# Patient Record
Sex: MALE | Race: WHITE | HISPANIC OR LATINO | URBAN - METROPOLITAN AREA
[De-identification: names, ages, dates, MRNs, and addresses within clinical notes are randomized per-mention and may not be internally consistent; named-entity substitution may affect disease eponyms.]

---

## 2017-06-23 ENCOUNTER — INPATIENT (INPATIENT)
Facility: HOSPITAL | Age: 77
LOS: 7 days | Discharge: HOME | End: 2017-07-01
Attending: INTERNAL MEDICINE | Admitting: INTERNAL MEDICINE
Payer: MEDICAID

## 2017-06-23 VITALS
RESPIRATION RATE: 18 BRPM | OXYGEN SATURATION: 97 % | HEART RATE: 66 BPM | SYSTOLIC BLOOD PRESSURE: 79 MMHG | DIASTOLIC BLOOD PRESSURE: 44 MMHG | TEMPERATURE: 98 F

## 2017-06-23 DIAGNOSIS — Z71.89 OTHER SPECIFIED COUNSELING: ICD-10-CM

## 2017-06-23 DIAGNOSIS — C16.9 MALIGNANT NEOPLASM OF STOMACH, UNSPECIFIED: ICD-10-CM

## 2017-06-23 DIAGNOSIS — Z90.3 ACQUIRED ABSENCE OF STOMACH [PART OF]: Chronic | ICD-10-CM

## 2017-06-23 DIAGNOSIS — C79.9 SECONDARY MALIGNANT NEOPLASM OF UNSPECIFIED SITE: ICD-10-CM

## 2017-06-23 DIAGNOSIS — Z90.5 ACQUIRED ABSENCE OF KIDNEY: Chronic | ICD-10-CM

## 2017-06-23 DIAGNOSIS — Z90.79 ACQUIRED ABSENCE OF OTHER GENITAL ORGAN(S): Chronic | ICD-10-CM

## 2017-06-23 DIAGNOSIS — N39.0 URINARY TRACT INFECTION, SITE NOT SPECIFIED: ICD-10-CM

## 2017-06-23 DIAGNOSIS — N40.0 BENIGN PROSTATIC HYPERPLASIA WITHOUT LOWER URINARY TRACT SYMPTOMS: ICD-10-CM

## 2017-06-23 DIAGNOSIS — R91.8 OTHER NONSPECIFIC ABNORMAL FINDING OF LUNG FIELD: ICD-10-CM

## 2017-06-23 DIAGNOSIS — D63.0 ANEMIA IN NEOPLASTIC DISEASE: ICD-10-CM

## 2017-06-23 DIAGNOSIS — Z29.9 ENCOUNTER FOR PROPHYLACTIC MEASURES, UNSPECIFIED: ICD-10-CM

## 2017-06-23 DIAGNOSIS — N17.9 ACUTE KIDNEY FAILURE, UNSPECIFIED: ICD-10-CM

## 2017-06-23 DIAGNOSIS — R19.7 DIARRHEA, UNSPECIFIED: ICD-10-CM

## 2017-06-23 LAB
ALBUMIN SERPL ELPH-MCNC: 3.6 G/DL — SIGNIFICANT CHANGE UP (ref 3.3–5)
ALP SERPL-CCNC: 158 U/L — HIGH (ref 40–120)
ALT FLD-CCNC: 19 U/L — SIGNIFICANT CHANGE UP (ref 4–41)
APPEARANCE UR: CLEAR — SIGNIFICANT CHANGE UP
APTT BLD: 33.4 SEC — SIGNIFICANT CHANGE UP (ref 27.5–37.4)
AST SERPL-CCNC: 23 U/L — SIGNIFICANT CHANGE UP (ref 4–40)
BASE EXCESS BLDV CALC-SCNC: -3.1 MMOL/L — SIGNIFICANT CHANGE UP
BILIRUB SERPL-MCNC: 0.5 MG/DL — SIGNIFICANT CHANGE UP (ref 0.2–1.2)
BILIRUB UR-MCNC: NEGATIVE — SIGNIFICANT CHANGE UP
BLOOD GAS VENOUS - CREATININE: 1.33 MG/DL — HIGH (ref 0.5–1.3)
BLOOD UR QL VISUAL: NEGATIVE — SIGNIFICANT CHANGE UP
BUN SERPL-MCNC: 28 MG/DL — HIGH (ref 7–23)
CALCIUM SERPL-MCNC: 9.5 MG/DL — SIGNIFICANT CHANGE UP (ref 8.4–10.5)
CHLORIDE BLDV-SCNC: 109 MMOL/L — HIGH (ref 96–108)
CHLORIDE SERPL-SCNC: 104 MMOL/L — SIGNIFICANT CHANGE UP (ref 98–107)
CK MB BLD-MCNC: 1.73 NG/ML — SIGNIFICANT CHANGE UP (ref 1–6.6)
CK SERPL-CCNC: 43 U/L — SIGNIFICANT CHANGE UP (ref 30–200)
CO2 SERPL-SCNC: 22 MMOL/L — SIGNIFICANT CHANGE UP (ref 22–31)
COLOR SPEC: YELLOW — SIGNIFICANT CHANGE UP
CREAT SERPL-MCNC: 1.55 MG/DL — HIGH (ref 0.5–1.3)
GAS PNL BLDV: 139 MMOL/L — SIGNIFICANT CHANGE UP (ref 136–146)
GLUCOSE BLDV-MCNC: 100 — HIGH (ref 70–99)
GLUCOSE SERPL-MCNC: 97 MG/DL — SIGNIFICANT CHANGE UP (ref 70–99)
GLUCOSE UR-MCNC: NEGATIVE — SIGNIFICANT CHANGE UP
HCO3 BLDV-SCNC: 20 MMOL/L — SIGNIFICANT CHANGE UP (ref 20–27)
HCT VFR BLD CALC: 32.6 % — LOW (ref 39–50)
HCT VFR BLD CALC: 32.6 % — LOW (ref 39–50)
HCT VFR BLDV CALC: 33 % — LOW (ref 39–51)
HGB BLD-MCNC: 10.4 G/DL — LOW (ref 13–17)
HGB BLD-MCNC: 10.4 G/DL — LOW (ref 13–17)
HGB BLDV-MCNC: 10.7 G/DL — LOW (ref 13–17)
HYALINE CASTS # UR AUTO: SIGNIFICANT CHANGE UP (ref 0–?)
INR BLD: 1.13 — SIGNIFICANT CHANGE UP (ref 0.88–1.17)
KETONES UR-MCNC: NEGATIVE — SIGNIFICANT CHANGE UP
LACTATE BLDV-MCNC: 1.1 MMOL/L — SIGNIFICANT CHANGE UP (ref 0.5–2)
LEUKOCYTE ESTERASE UR-ACNC: HIGH
MCHC RBC-ENTMCNC: 30.4 PG — SIGNIFICANT CHANGE UP (ref 27–34)
MCHC RBC-ENTMCNC: 30.4 PG — SIGNIFICANT CHANGE UP (ref 27–34)
MCHC RBC-ENTMCNC: 31.9 % — LOW (ref 32–36)
MCHC RBC-ENTMCNC: 31.9 % — LOW (ref 32–36)
MCV RBC AUTO: 95.3 FL — SIGNIFICANT CHANGE UP (ref 80–100)
MCV RBC AUTO: 95.3 FL — SIGNIFICANT CHANGE UP (ref 80–100)
MUCOUS THREADS # UR AUTO: SIGNIFICANT CHANGE UP
NITRITE UR-MCNC: NEGATIVE — SIGNIFICANT CHANGE UP
NON-SQ EPI CELLS # UR AUTO: <1 — SIGNIFICANT CHANGE UP
OB PNL STL: NEGATIVE — SIGNIFICANT CHANGE UP
PCO2 BLDV: 48 MMHG — SIGNIFICANT CHANGE UP (ref 41–51)
PH BLDV: 7.29 PH — LOW (ref 7.32–7.43)
PH UR: 6 — SIGNIFICANT CHANGE UP (ref 4.6–8)
PLATELET # BLD AUTO: 124 K/UL — LOW (ref 150–400)
PLATELET # BLD AUTO: 124 K/UL — LOW (ref 150–400)
PMV BLD: 8.9 FL — SIGNIFICANT CHANGE UP (ref 7–13)
PMV BLD: 8.9 FL — SIGNIFICANT CHANGE UP (ref 7–13)
PO2 BLDV: 25 MMHG — LOW (ref 35–40)
POTASSIUM BLDV-SCNC: 4.3 MMOL/L — SIGNIFICANT CHANGE UP (ref 3.4–4.5)
POTASSIUM SERPL-MCNC: 4.4 MMOL/L — SIGNIFICANT CHANGE UP (ref 3.5–5.3)
POTASSIUM SERPL-SCNC: 4.4 MMOL/L — SIGNIFICANT CHANGE UP (ref 3.5–5.3)
PROT SERPL-MCNC: 8.7 G/DL — HIGH (ref 6–8.3)
PROT UR-MCNC: 20 — SIGNIFICANT CHANGE UP
PROTHROM AB SERPL-ACNC: 12.7 SEC — SIGNIFICANT CHANGE UP (ref 9.8–13.1)
RBC # BLD: 3.42 M/UL — LOW (ref 4.2–5.8)
RBC # BLD: 3.42 M/UL — LOW (ref 4.2–5.8)
RBC # FLD: 15.6 % — HIGH (ref 10.3–14.5)
RBC # FLD: 15.6 % — HIGH (ref 10.3–14.5)
RBC CASTS # UR COMP ASSIST: SIGNIFICANT CHANGE UP (ref 0–?)
SAO2 % BLDV: 34.5 % — LOW (ref 60–85)
SODIUM SERPL-SCNC: 139 MMOL/L — SIGNIFICANT CHANGE UP (ref 135–145)
SP GR SPEC: 1.01 — SIGNIFICANT CHANGE UP (ref 1–1.03)
SQUAMOUS # UR AUTO: SIGNIFICANT CHANGE UP
TROPONIN T SERPL-MCNC: < 0.06 NG/ML — SIGNIFICANT CHANGE UP (ref 0–0.06)
UROBILINOGEN FLD QL: NORMAL E.U. — SIGNIFICANT CHANGE UP (ref 0.1–0.2)
VIT B12 SERPL-MCNC: 2000 PG/ML — HIGH (ref 200–900)
WBC # BLD: 6.63 K/UL — SIGNIFICANT CHANGE UP (ref 3.8–10.5)
WBC # BLD: 6.63 K/UL — SIGNIFICANT CHANGE UP (ref 3.8–10.5)
WBC # FLD AUTO: 6.63 K/UL — SIGNIFICANT CHANGE UP (ref 3.8–10.5)
WBC # FLD AUTO: 6.63 K/UL — SIGNIFICANT CHANGE UP (ref 3.8–10.5)
WBC UR QL: SIGNIFICANT CHANGE UP (ref 0–?)

## 2017-06-23 PROCEDURE — 99223 1ST HOSP IP/OBS HIGH 75: CPT

## 2017-06-23 PROCEDURE — 99497 ADVNCD CARE PLAN 30 MIN: CPT | Mod: 25

## 2017-06-23 PROCEDURE — 71020: CPT | Mod: 26

## 2017-06-23 PROCEDURE — 70450 CT HEAD/BRAIN W/O DYE: CPT | Mod: 26

## 2017-06-23 PROCEDURE — 71250 CT THORAX DX C-: CPT | Mod: 26

## 2017-06-23 RX ORDER — CIPROFLOXACIN LACTATE 400MG/40ML
250 VIAL (ML) INTRAVENOUS EVERY 24 HOURS
Qty: 0 | Refills: 0 | Status: DISCONTINUED | OUTPATIENT
Start: 2017-06-23 | End: 2017-06-30

## 2017-06-23 RX ORDER — ACETAMINOPHEN 500 MG
650 TABLET ORAL EVERY 6 HOURS
Qty: 0 | Refills: 0 | Status: DISCONTINUED | OUTPATIENT
Start: 2017-06-23 | End: 2017-07-01

## 2017-06-23 RX ORDER — METOCLOPRAMIDE HCL 10 MG
10 TABLET ORAL ONCE
Qty: 0 | Refills: 0 | Status: COMPLETED | OUTPATIENT
Start: 2017-06-23 | End: 2017-06-23

## 2017-06-23 RX ORDER — HYDROCORTISONE 1 %
1 OINTMENT (GRAM) TOPICAL
Qty: 0 | Refills: 0 | Status: DISCONTINUED | OUTPATIENT
Start: 2017-06-23 | End: 2017-07-01

## 2017-06-23 RX ORDER — SODIUM CHLORIDE 9 MG/ML
1000 INJECTION INTRAMUSCULAR; INTRAVENOUS; SUBCUTANEOUS ONCE
Qty: 0 | Refills: 0 | Status: COMPLETED | OUTPATIENT
Start: 2017-06-23 | End: 2017-06-23

## 2017-06-23 RX ORDER — HEPARIN SODIUM 5000 [USP'U]/ML
5000 INJECTION INTRAVENOUS; SUBCUTANEOUS EVERY 8 HOURS
Qty: 0 | Refills: 0 | Status: DISCONTINUED | OUTPATIENT
Start: 2017-06-23 | End: 2017-07-01

## 2017-06-23 RX ORDER — CIPROFLOXACIN LACTATE 400MG/40ML
500 VIAL (ML) INTRAVENOUS EVERY 12 HOURS
Qty: 0 | Refills: 0 | Status: DISCONTINUED | OUTPATIENT
Start: 2017-06-23 | End: 2017-06-23

## 2017-06-23 RX ORDER — SODIUM CHLORIDE 9 MG/ML
1000 INJECTION INTRAMUSCULAR; INTRAVENOUS; SUBCUTANEOUS
Qty: 0 | Refills: 0 | Status: DISCONTINUED | OUTPATIENT
Start: 2017-06-23 | End: 2017-06-30

## 2017-06-23 RX ADMIN — SODIUM CHLORIDE 2000 MILLILITER(S): 9 INJECTION INTRAMUSCULAR; INTRAVENOUS; SUBCUTANEOUS at 09:46

## 2017-06-23 RX ADMIN — HEPARIN SODIUM 5000 UNIT(S): 5000 INJECTION INTRAVENOUS; SUBCUTANEOUS at 21:53

## 2017-06-23 RX ADMIN — Medication 250 MILLIGRAM(S): at 22:32

## 2017-06-23 RX ADMIN — SODIUM CHLORIDE 75 MILLILITER(S): 9 INJECTION INTRAMUSCULAR; INTRAVENOUS; SUBCUTANEOUS at 17:59

## 2017-06-23 RX ADMIN — Medication 10 MILLIGRAM(S): at 09:46

## 2017-06-23 NOTE — H&P ADULT - NSHPPHYSICALEXAM_GEN_ALL_CORE
PHYSICAL EXAM:  GENERAL: elderly male, no apparent distress, on room air  HEAD:  Atraumatic, Normocephalic  EYES: EOMI, PERRLA, conjunctiva and sclera clear b/l  CHEST/LUNG: Clear to auscultation bilaterally; No wheezing or crackles  HEART: S1/S2, Regular rate and rhythm; No murmurs, rubs, or gallops  ABDOMEN: Soft, Nontender, Nondistended; Bowel sounds present, large midline scar and small scar in RLQ  EXTREMITIES:  2+ Peripheral Pulses, No clubbing, cyanosis, or edema  PSYCH: normal affect, calm demeanor  NEUROLOGY: AAOX3, no sensory or motor deficits, 5/5 muscle strength equal in all extremities, CN 2-12 grossly intact, negative Kernig and Brudzinski signs  SKIN: No rashes or lesions

## 2017-06-23 NOTE — ED PROVIDER NOTE - OBJECTIVE STATEMENT
77M presents to the ED for not feeling well for 2 days, generalized weakness causing difficulty walking, back pain and subjective fever at night, no cough, mild SOB.  C/O pain to wound site that is no worse since surgery.  Diarrhea for one day 3 times, no blood, but black.  No hx of ulcers.      Pt had tumor from stomach extending to intestine and kidney which was removed in [place] two weeks ago (complicated by intestinal perforation).. As per family no chemotherapy. 77M presents to the ED for not feeling well for 2 days, generalized weakness causing difficulty walking, back pain and subjective fever at night, no cough, mild SOB.  C/O pain to wound site that is no worse since surgery.  Diarrhea for one day 3 times, no blood, but black.  No hx of ulcers.  Pt with headache intermittently over the past few weeks, and developed dizziness described as lightheadedness today.  No hearing loss, no tinnitus.    Pt had tumor from stomach extending to intestine and kidney which was removed in [place] two weeks ago (complicated by intestinal perforation).. As per family no chemotherapy.

## 2017-06-23 NOTE — H&P ADULT - HISTORY OF PRESENT ILLNESS
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 No recent use of antibiotics, no blood in stool but reports stool is black color. No chest pain, cough, SOB, nausea or vomiting. No abdominal pain or back pain but complains of itchiness of back. Complains of headaches and dizziness mainly with walking and b/l feet pain that radiates up to head. No recent falls. Walking limited only by feeling tired. No numbness/tingling. No bowel or bladder incontinence. Currently without headache or dizziness.     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 No recent use of antibiotics, no blood in stool but reports stool is black color. No chest pain, cough, SOB, nausea or vomiting. No abdominal pain or back pain but complains of itchiness of back. Complains of headaches and dizziness mainly with walking and b/l feet pain that radiates up to head. No recent falls. Walking limited only by feeling tired. No numbness/tingling. No bowel or bladder incontinence. Currently without headache or dizziness.     ED: Reglan, 1 L NS, CT head negative  Was hypotensive in ED to 79/44, improved with 1L NS to 106/74, orthostatic negative.    Vital Signs Last 24 Hrs  T(C): 36.7, Max: 36.7 (06-23 @ 14:05)  T(F): 98.1, Max: 98.1 (06-23 @ 14:05)  HR: 65 (65 - 66)  BP: 106/75 (79/44 - 106/75)  BP(mean): --  RR: 16 (16 - 18)  SpO2: 100% (97% - 100%)

## 2017-06-23 NOTE — ED ADULT TRIAGE NOTE - CHIEF COMPLAINT QUOTE
Co sudden onset of dizziness x 10 minutes ago. Co headache and bilateral leg pain. Hx of tumor removal in kidney x 2 months ago.

## 2017-06-23 NOTE — H&P ADULT - NSHPSOCIALHISTORY_GEN_ALL_CORE
denies smoking, ETOH use and drug use, lives in [place] but now visiting [place] and staying with daughter

## 2017-06-23 NOTE — H&P ADULT - ASSESSMENT
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

## 2017-06-23 NOTE — H&P ADULT - PROBLEM SELECTOR PLAN 4
CT chest without contrast to better evaluate, may need biopsy  Pulmonary consult for possible biopsy of pulmonary nodule after CT chest done

## 2017-06-23 NOTE — H&P ADULT - PROBLEM SELECTOR PLAN 9
30 min spent discussing goals of care with patient and daughter at bedside  Patient and daughter would like trial of resuscitation at this time, Full code  HCP: daughter Jenni Mcbride: [phone]

## 2017-06-23 NOTE — H&P ADULT - PROBLEM SELECTOR PLAN 5
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

## 2017-06-23 NOTE — ED PROVIDER NOTE - PHYSICAL EXAMINATION
ATTENDING PHYSICAL EXAM DR. HUGHES ***GEN - NAD; well appearing; A+O x3 ***HEAD - NC/AT ***EYES/NOSE - PERRL, EOMI, mucous membranes moist, no discharge ***THROAT: Oral cavity and pharynx normal. No inflammation, swelling, exudate, or lesions.  ***NECK: Neck supple, non-tender without lymphadenopathy, no masses, no thyromegaly.   ***PULMONARY - CTA b/l, symmetric breath sounds. ***CARDIAC -s1s2, RRR, no M,G,R  ***ABDOMEN - +BS, ND, NT, soft, no guarding, no rebound, no masses   ***BACK - no CVA tenderness, Normal  spine ***EXTREMITIES - symmetric pulses, 2+ dp, capillary refill < 2 seconds, no clubbing, no cyanosis, no edema ***SKIN - no rash or bruising   ***NEUROLOGIC - alert, CN 2-12 intact, reflexes nl, sensation nl, coordination nl, finger to nose nl, romberg negative, motor 5/5 RUE/LUE/RLE/LLE/EHL/Plantar flexion, no pronator drift, gait nl, propioception normal

## 2017-06-23 NOTE — ED ADULT NURSE NOTE - OBJECTIVE STATEMENT
Patient presented to ED A&O x4, with c/o headache,  dizziness, B/L lower leg extremity pain with radiation to knees, hips, and mid to lower back x 3 months d/t decreased mobility from tumor, with worsening of pain after surgery  (1.5 months) when attempting to regain walking. Denies any SOB or CP. EKG done and cardiac monitor in place.  Blood work drawn and sent to lab.  Urine specimen pending. Family present at the bedside.  ER physician evaluated patient, medication ordered and administered.  Plan is for ct scan and xray.  Will continue to monitor patient closely.  Patient is Moroccan speaking and spoken to in native language, and agrees to have family assist with translation.  Chhaya DELATORRE

## 2017-06-23 NOTE — ED PROVIDER NOTE - MEDICAL DECISION MAKING DETAILS
Weakness, diarrhea for one day, subjective fever, HA/dizziness - labs, B12 level, CT head, IVF, reassess,

## 2017-06-23 NOTE — H&P ADULT - PROBLEM SELECTOR PLAN 8
unknown baseline  Transfuse for Hgb <7, keep T/S active, check iron panel, folate, B12>1000  FOBT negative

## 2017-06-24 DIAGNOSIS — R50.9 FEVER, UNSPECIFIED: ICD-10-CM

## 2017-06-24 DIAGNOSIS — R22.9 LOCALIZED SWELLING, MASS AND LUMP, UNSPECIFIED: ICD-10-CM

## 2017-06-24 LAB
ALBUMIN SERPL ELPH-MCNC: 3.1 G/DL — LOW (ref 3.3–5)
ALP SERPL-CCNC: 136 U/L — HIGH (ref 40–120)
ALT FLD-CCNC: 15 U/L — SIGNIFICANT CHANGE UP (ref 4–41)
AST SERPL-CCNC: 18 U/L — SIGNIFICANT CHANGE UP (ref 4–40)
BASOPHILS # BLD AUTO: 0.01 K/UL — SIGNIFICANT CHANGE UP (ref 0–0.2)
BASOPHILS NFR BLD AUTO: 0.2 % — SIGNIFICANT CHANGE UP (ref 0–2)
BILIRUB SERPL-MCNC: 0.4 MG/DL — SIGNIFICANT CHANGE UP (ref 0.2–1.2)
BLD GP AB SCN SERPL QL: NEGATIVE — SIGNIFICANT CHANGE UP
BUN SERPL-MCNC: 22 MG/DL — SIGNIFICANT CHANGE UP (ref 7–23)
CALCIUM SERPL-MCNC: 8.8 MG/DL — SIGNIFICANT CHANGE UP (ref 8.4–10.5)
CHLORIDE SERPL-SCNC: 110 MMOL/L — HIGH (ref 98–107)
CO2 SERPL-SCNC: 17 MMOL/L — LOW (ref 22–31)
CREAT SERPL-MCNC: 1.03 MG/DL — SIGNIFICANT CHANGE UP (ref 0.5–1.3)
EOSINOPHIL # BLD AUTO: 0.18 K/UL — SIGNIFICANT CHANGE UP (ref 0–0.5)
EOSINOPHIL NFR BLD AUTO: 3.8 % — SIGNIFICANT CHANGE UP (ref 0–6)
FERRITIN SERPL-MCNC: 995.9 NG/ML — HIGH (ref 30–400)
FOLATE SERPL-MCNC: 7.3 NG/ML — SIGNIFICANT CHANGE UP (ref 4.7–20)
GLUCOSE SERPL-MCNC: 81 MG/DL — SIGNIFICANT CHANGE UP (ref 70–99)
HCT VFR BLD CALC: 30.8 % — LOW (ref 39–50)
HGB BLD-MCNC: 9.8 G/DL — LOW (ref 13–17)
HIV1 AG SER QL: SIGNIFICANT CHANGE UP
HIV1+2 AB SPEC QL: SIGNIFICANT CHANGE UP
IMM GRANULOCYTES NFR BLD AUTO: 0 % — SIGNIFICANT CHANGE UP (ref 0–1.5)
IRON SATN MFR SERPL: 121 UG/DL — LOW (ref 155–535)
IRON SATN MFR SERPL: 42 UG/DL — LOW (ref 45–165)
LYMPHOCYTES # BLD AUTO: 1.55 K/UL — SIGNIFICANT CHANGE UP (ref 1–3.3)
LYMPHOCYTES # BLD AUTO: 32.5 % — SIGNIFICANT CHANGE UP (ref 13–44)
MAGNESIUM SERPL-MCNC: 1.3 MG/DL — LOW (ref 1.6–2.6)
MCHC RBC-ENTMCNC: 30.2 PG — SIGNIFICANT CHANGE UP (ref 27–34)
MCHC RBC-ENTMCNC: 31.8 % — LOW (ref 32–36)
MCV RBC AUTO: 94.8 FL — SIGNIFICANT CHANGE UP (ref 80–100)
MONOCYTES # BLD AUTO: 0.49 K/UL — SIGNIFICANT CHANGE UP (ref 0–0.9)
MONOCYTES NFR BLD AUTO: 10.3 % — SIGNIFICANT CHANGE UP (ref 2–14)
NEUTROPHILS # BLD AUTO: 2.54 K/UL — SIGNIFICANT CHANGE UP (ref 1.8–7.4)
NEUTROPHILS NFR BLD AUTO: 53.2 % — SIGNIFICANT CHANGE UP (ref 43–77)
PHOSPHATE SERPL-MCNC: 3.2 MG/DL — SIGNIFICANT CHANGE UP (ref 2.5–4.5)
PLATELET # BLD AUTO: 111 K/UL — LOW (ref 150–400)
PMV BLD: 9 FL — SIGNIFICANT CHANGE UP (ref 7–13)
POTASSIUM SERPL-MCNC: 4.4 MMOL/L — SIGNIFICANT CHANGE UP (ref 3.5–5.3)
POTASSIUM SERPL-SCNC: 4.4 MMOL/L — SIGNIFICANT CHANGE UP (ref 3.5–5.3)
PROT SERPL-MCNC: 7.8 G/DL — SIGNIFICANT CHANGE UP (ref 6–8.3)
RBC # BLD: 3.25 M/UL — LOW (ref 4.2–5.8)
RBC # FLD: 15.4 % — HIGH (ref 10.3–14.5)
RH IG SCN BLD-IMP: POSITIVE — SIGNIFICANT CHANGE UP
SODIUM SERPL-SCNC: 142 MMOL/L — SIGNIFICANT CHANGE UP (ref 135–145)
UIBC SERPL-MCNC: 79 UG/DL — LOW (ref 110–370)
URATE SERPL-MCNC: 7.1 MG/DL — SIGNIFICANT CHANGE UP (ref 3.4–8.8)
WBC # BLD: 4.77 K/UL — SIGNIFICANT CHANGE UP (ref 3.8–10.5)
WBC # FLD AUTO: 4.77 K/UL — SIGNIFICANT CHANGE UP (ref 3.8–10.5)

## 2017-06-24 PROCEDURE — 74177 CT ABD & PELVIS W/CONTRAST: CPT | Mod: 26

## 2017-06-24 PROCEDURE — 71260 CT THORAX DX C+: CPT | Mod: 26

## 2017-06-24 PROCEDURE — 99253 IP/OBS CNSLTJ NEW/EST LOW 45: CPT | Mod: GC

## 2017-06-24 RX ORDER — MAGNESIUM SULFATE 500 MG/ML
1 VIAL (ML) INJECTION ONCE
Qty: 0 | Refills: 0 | Status: COMPLETED | OUTPATIENT
Start: 2017-06-24 | End: 2017-06-24

## 2017-06-24 RX ADMIN — HEPARIN SODIUM 5000 UNIT(S): 5000 INJECTION INTRAVENOUS; SUBCUTANEOUS at 21:51

## 2017-06-24 RX ADMIN — Medication 100 GRAM(S): at 11:52

## 2017-06-24 RX ADMIN — HEPARIN SODIUM 5000 UNIT(S): 5000 INJECTION INTRAVENOUS; SUBCUTANEOUS at 14:26

## 2017-06-24 RX ADMIN — SODIUM CHLORIDE 75 MILLILITER(S): 9 INJECTION INTRAMUSCULAR; INTRAVENOUS; SUBCUTANEOUS at 05:18

## 2017-06-24 RX ADMIN — HEPARIN SODIUM 5000 UNIT(S): 5000 INJECTION INTRAVENOUS; SUBCUTANEOUS at 05:13

## 2017-06-24 RX ADMIN — Medication 250 MILLIGRAM(S): at 19:25

## 2017-06-24 NOTE — CONSULT NOTE ADULT - ASSESSMENT
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 malignancy? dx in [place] with known disease extending to kidney s.p nephrectomy and partial gastrectomy, treatment naive p/w fever 77 male with ? malignancy? dx in [place] with known disease extending to kidney s.p nephrectomy and partial gastrectomy, treatment naive p/w fever diarrhea, with CT chest concerning for metastatic disease.

## 2017-06-24 NOTE — CONSULT NOTE ADULT - SUBJECTIVE AND OBJECTIVE BOX
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 No recent use of antibiotics, no blood in stool but reports stool is black color. No chest pain, cough, SOB, nausea or vomiting. No abdominal pain or back pain but complains of itchiness of back. Complains of headaches and dizziness mainly with walking and b/l feet pain that radiates up to head. No recent falls. Walking limited only by feeling tired. No numbness/tingling. No bowel or bladder incontinence. Currently without headache or dizziness.     ED: Reglan, 1 L NS, CT head negative  Was hypotensive in ED to 79/44, improved with 1L NS to 106/74, orthostatic negative.    Vital Signs Last 24 Hrs  T(C): 36.7, Max: 36.7 (06-23 @ 14:05)  T(F): 98.1, Max: 98.1 (06-23 @ 14:05)  HR: 65 (65 - 66)  BP: 106/75 (79/44 - 106/75)  BP(mean): --  RR: 16 (16 - 18)  SpO2: 100% (97% - 100%) (2017 14:53)      PAST MEDICAL & SURGICAL HISTORY:  BPH (benign prostatic hyperplasia)  Stomach cancer  S/P partial gastrectomy  H/O unilateral nephrectomy  H/O prostatectomy      General: denies fevers, chills  Skin/Breast: denies rash   Ophthalmologic: denies blurry vision  ENMT: denies throat pain  Respiratory and Thorax: denies cough, denies shortness of breath  Cardiovascular: denies chest pain, palpitations. Denies LE swelling   Gastrointestinal: denies abdominal pain/ nausea/ vomiting/ diarrhea. Denies BRBPR/ melena   Genitourinary: Denies dysuria  Musculoskeletal: Denies mylagias   Neurological: Denies syncope  Psychiatric: Denies mood disturbance   Hematology/Lymphatics: denies bleeding/bruising. Denies skin lumps 	    MEDICATIONS  (STANDING):  heparin  Injectable 5000Unit(s) SubCutaneous every 8 hours  sodium chloride 0.9%. 1000milliLiter(s) IV Continuous <Continuous>  ciprofloxacin     Tablet 250milliGRAM(s) Oral every 24 hours  magnesium sulfate  IVPB 1Gram(s) IV Intermittent once    MEDICATIONS  (PRN):  acetaminophen   Tablet. 650milliGRAM(s) Oral every 6 hours PRN Moderate Pain (4 - 6)  hydrocortisone 0.5% Ointment 1Application(s) Topical two times a day PRN Itching      Allergies    No Known Allergies    Intolerances        SOCIAL HISTORY:    FAMILY HISTORY:  No pertinent family history in first degree relatives      Vital Signs Last 24 Hrs  T(C): 37, Max: 37 ( @ 05:11)  T(F): 98.6, Max: 98.6 ( @ 05:11)  HR: 63 (61 - 65)  BP: 102/52 (102/52 - 113/56)  BP(mean): --  RR: 16 (16 - 16)  SpO2: 97% (97% - 100%)    PHYSICAL EXAM:    GENERAL: NAD, AAOx3   HEAD:  NC/AT  EYES: EOMI, PERRLA, no scleral icterus  HEENT: Moist mucous membranes  LUNG: Clear to auscultation bilaterally; No rales, rhonchi, wheezing, or rubs  HEART: RRR; No murmurs, rubs, or gallops  ABDOMEN: +BS, ST/ND/NT  EXTREMITIES:  2+ Peripheral Pulses, No clubbing, cyanosis, or edema  LAD: no palpable adenopathy    LABS:                        9.8    4.77  )-----------( 111      ( 2017 06:44 )             30.8     06-24    142  |  110<H>  |  22  ----------------------------<  81  4.4   |  17<L>  |  1.03    Ca    8.8      2017 06:44  Phos  3.2     06-24  Mg     1.3     -24    TPro  7.8  /  Alb  3.1<L>  /  TBili  0.4  /  DBili  x   /  AST  18  /  ALT  15  /  AlkPhos  136<H>  06-24    PT/INR - ( 2017 09:30 )   PT: 12.7 SEC;   INR: 1.13          PTT - ( 2017 09:30 )  PTT:33.4 SEC  Urinalysis Basic - ( 2017 11:10 )    Color: YELLOW / Appearance: CLEAR / S.010 / pH: 6.0  Gluc: NEGATIVE / Ketone: NEGATIVE  / Bili: NEGATIVE / Urobili: NORMAL E.U.   Blood: NEGATIVE / Protein: 20 / Nitrite: NEGATIVE   Leuk Esterase: MODERATE / RBC: 0-2 / WBC 10-25   Sq Epi: OCC / Non Sq Epi: x / Bacteria: x      Folate, Serum: 7.3 ng/mL ( @ 06:44)        RADIOLOGY & ADDITIONAL STUDIES:    EXAM:  CT BRAIN        PROCEDURE DATE:  2017         INTERPRETATION:  History stomach cancer. Headache.     Multiple axial sections were performed from base of skull to vertex   without contrast enhancement.    Parenchymal volume loss is seen.    There is no evidence of acute hemorrhage, mass, mass effect or acute   territorial infarct seen.    Evaluation of the osseous structures with the appropriate window appears   unremarkable    The visualized paranasal sinuses mastoid and middle ear regions appear   clear.    Impression: No evidence of acute hemorrhage, mass or mass effect.                  QUAN GUTIERREZ M.D., ATTENDING RADIOLOGIST  This document has been electronically signed. 2017 10:10AM              EXAM:  RAD CHEST PA LAT        PROCEDURE DATE:  2017         INTERPRETATION:  CLINICAL INDICATION: dizziness; gastric cancer status   post partial gastrectomy; history of prostatectomy and unilateral   nephrectomy    EXAM:  Frontal and lateral chest from 2017 at 0946. No prior chest x-ray   available at this institution for comparison.    IMPRESSION:  Multiple bilateral pulmonary nodules with basilar predominance suspicious   for metastatic disease. Largest in peripheral left lower lung measuring   approximately 3.1 cm in diameter. Chest CT advised to further assess.    No focal infiltrates, pleural effusions, pneumothorax, or pulmonary edema.    Cardiac and mediastinal silhouettes otherwise within normal limits.    Trachea midline.    Generalized osteopenia and mild spinal degenerative changes.    Discussed with ED attending Dr. Fenton on 2017 at 1155 with read   back.                   HUY BUSBY M.D., ATTENDING RADIOLOGIST HPI:  77 male, Romansh speaking visiting from [place], PMH ? 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 No recent use of antibiotics, no blood in stool but reports stool is black color. No chest pain, cough, SOB, nausea or vomiting. No abdominal pain or back pain but complains of itchiness of back. Complains of headaches and dizziness mainly with walking and b/l feet pain that radiates up to head. No recent falls. Walking limited only by feeling tired. No numbness/tingling. No bowel or bladder incontinence. Currently without headache or dizziness.     ED: Reglan, 1 L NS, CT head negative  Was hypotensive in ED to 79/44, improved with 1L NS to 106/74, orthostatic negative.    Vital Signs Last 24 Hrs  T(C): 36.7, Max: 36.7 (06-23 @ 14:05)  T(F): 98.1, Max: 98.1 (06-23 @ 14:05)  HR: 65 (65 - 66)  BP: 106/75 (79/44 - 106/75)  BP(mean): --  RR: 16 (16 - 18)  SpO2: 100% (97% - 100%) (2017 14:53)      PAST MEDICAL & SURGICAL HISTORY:  BPH (benign prostatic hyperplasia)  Stomach cancer  S/P partial gastrectomy  H/O unilateral nephrectomy  H/O prostatectomy      General: denies fevers, chills  Skin/Breast: denies rash   Ophthalmologic: denies blurry vision  ENMT: denies throat pain  Respiratory and Thorax: denies cough, denies shortness of breath  Cardiovascular: denies chest pain, palpitations. Denies LE swelling   Gastrointestinal: denies abdominal pain/ nausea/ vomiting/ diarrhea. Denies BRBPR/ melena   Genitourinary: Denies dysuria  Musculoskeletal: Denies mylagias   Neurological: Denies syncope  Psychiatric: Denies mood disturbance   Hematology/Lymphatics: denies bleeding/bruising. Denies skin lumps 	    MEDICATIONS  (STANDING):  heparin  Injectable 5000Unit(s) SubCutaneous every 8 hours  sodium chloride 0.9%. 1000milliLiter(s) IV Continuous <Continuous>  ciprofloxacin     Tablet 250milliGRAM(s) Oral every 24 hours  magnesium sulfate  IVPB 1Gram(s) IV Intermittent once    MEDICATIONS  (PRN):  acetaminophen   Tablet. 650milliGRAM(s) Oral every 6 hours PRN Moderate Pain (4 - 6)  hydrocortisone 0.5% Ointment 1Application(s) Topical two times a day PRN Itching      Allergies    No Known Allergies    Intolerances        SOCIAL HISTORY:    FAMILY HISTORY:  No pertinent family history in first degree relatives      Vital Signs Last 24 Hrs  T(C): 37, Max: 37 ( @ 05:11)  T(F): 98.6, Max: 98.6 ( @ 05:11)  HR: 63 (61 - 65)  BP: 102/52 (102/52 - 113/56)  BP(mean): --  RR: 16 (16 - 16)  SpO2: 97% (97% - 100%)    PHYSICAL EXAM:    GENERAL: NAD, AAOx3   HEAD:  NC/AT  EYES: EOMI, PERRLA, no scleral icterus  HEENT: Moist mucous membranes  LUNG: Clear to auscultation bilaterally; No rales, rhonchi, wheezing, or rubs  HEART: RRR; No murmurs, rubs, or gallops  ABDOMEN: +BS, ST/ND/NT  EXTREMITIES:  2+ Peripheral Pulses, No clubbing, cyanosis, or edema  LAD: no palpable adenopathy    LABS:                        9.8    4.77  )-----------( 111      ( 2017 06:44 )             30.8     06-24    142  |  110<H>  |  22  ----------------------------<  81  4.4   |  17<L>  |  1.03    Ca    8.8      2017 06:44  Phos  3.2     -24  Mg     1.3     -24    TPro  7.8  /  Alb  3.1<L>  /  TBili  0.4  /  DBili  x   /  AST  18  /  ALT  15  /  AlkPhos  136<H>  06-24    PT/INR - ( 2017 09:30 )   PT: 12.7 SEC;   INR: 1.13          PTT - ( 2017 09:30 )  PTT:33.4 SEC  Urinalysis Basic - ( 2017 11:10 )    Color: YELLOW / Appearance: CLEAR / S.010 / pH: 6.0  Gluc: NEGATIVE / Ketone: NEGATIVE  / Bili: NEGATIVE / Urobili: NORMAL E.U.   Blood: NEGATIVE / Protein: 20 / Nitrite: NEGATIVE   Leuk Esterase: MODERATE / RBC: 0-2 / WBC 10-25   Sq Epi: OCC / Non Sq Epi: x / Bacteria: x      Folate, Serum: 7.3 ng/mL ( @ 06:44)        RADIOLOGY & ADDITIONAL STUDIES:    EXAM:  CT BRAIN        PROCEDURE DATE:  2017         INTERPRETATION:  History stomach cancer. Headache.     Multiple axial sections were performed from base of skull to vertex   without contrast enhancement.    Parenchymal volume loss is seen.    There is no evidence of acute hemorrhage, mass, mass effect or acute   territorial infarct seen.    Evaluation of the osseous structures with the appropriate window appears   unremarkable    The visualized paranasal sinuses mastoid and middle ear regions appear   clear.    Impression: No evidence of acute hemorrhage, mass or mass effect.                  QUAN GUTIERREZ M.D., ATTENDING RADIOLOGIST  This document has been electronically signed. 2017 10:10AM              EXAM:  RAD CHEST PA LAT        PROCEDURE DATE:  2017         INTERPRETATION:  CLINICAL INDICATION: dizziness; gastric cancer status   post partial gastrectomy; history of prostatectomy and unilateral   nephrectomy    EXAM:  Frontal and lateral chest from 2017 at 0946. No prior chest x-ray   available at this institution for comparison.    IMPRESSION:  Multiple bilateral pulmonary nodules with basilar predominance suspicious   for metastatic disease. Largest in peripheral left lower lung measuring   approximately 3.1 cm in diameter. Chest CT advised to further assess.    No focal infiltrates, pleural effusions, pneumothorax, or pulmonary edema.    Cardiac and mediastinal silhouettes otherwise within normal limits.    Trachea midline.    Generalized osteopenia and mild spinal degenerative changes.    Discussed with ED attending Dr. Fenton on 2017 at 1155 with read   back.                   HUY BUSBY M.D., ATTENDING RADIOLOGIST HPI:  77 male, Serbian speaking visiting from [place], PMH ? 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 No recent use of antibiotics, no blood in stool but reports stool is black color. No chest pain, cough, SOB, nausea or vomiting. No abdominal pain or back pain but complains of itchiness of back. Complains of headaches and dizziness mainly with walking and b/l feet pain that radiates up to head. No recent falls. Walking limited only by feeling tired. No numbness/tingling. No bowel or bladder incontinence. Currently without headache or dizziness.     ED: Reglan, 1 L NS, CT head negative  Was hypotensive in ED to 79/44, improved with 1L NS to 106/74, orthostatic negative.    Vital Signs Last 24 Hrs  T(C): 36.7, Max: 36.7 (06-23 @ 14:05)  T(F): 98.1, Max: 98.1 (06-23 @ 14:05)  HR: 65 (65 - 66)  BP: 106/75 (79/44 - 106/75)  BP(mean): --  RR: 16 (16 - 18)  SpO2: 100% (97% - 100%) (2017 14:53)      PAST MEDICAL & SURGICAL HISTORY:  BPH (benign prostatic hyperplasia)  S/P partial gastrectomy  H/O unilateral nephrectomy  H/O TURP      General: fatigue, weight loss  Skin/Breast: denies rash   Ophthalmologic: denies blurry vision  ENMT: denies throat pain  Respiratory and Thorax: denies cough, denies shortness of breath  Cardiovascular: denies chest pain, palpitations. Denies LE swelling   Gastrointestinal: denies abdominal pain/ nausea/ vomiting/ diarrhea. Denies BRBPR/ melena   Genitourinary: Denies dysuria  Musculoskeletal: c/o back pain  Neurological: Denies syncope  Psychiatric: Denies mood disturbance   Hematology/Lymphatics: denies bleeding/bruising. Denies skin lumps 	    MEDICATIONS  (STANDING):  heparin  Injectable 5000Unit(s) SubCutaneous every 8 hours  sodium chloride 0.9%. 1000milliLiter(s) IV Continuous <Continuous>  ciprofloxacin     Tablet 250milliGRAM(s) Oral every 24 hours  magnesium sulfate  IVPB 1Gram(s) IV Intermittent once    MEDICATIONS  (PRN):  acetaminophen   Tablet. 650milliGRAM(s) Oral every 6 hours PRN Moderate Pain (4 - 6)  hydrocortisone 0.5% Ointment 1Application(s) Topical two times a day PRN Itching      Allergies    No Known Allergies    Intolerances        SOCIAL HISTORY:    FAMILY HISTORY:  No pertinent family history in first degree relatives      Vital Signs Last 24 Hrs  T(C): 37, Max: 37 ( @ 05:11)  T(F): 98.6, Max: 98.6 ( @ 05:11)  HR: 63 (61 - 65)  BP: 102/52 (102/52 - 113/56)  BP(mean): --  RR: 16 (16 - 16)  SpO2: 97% (97% - 100%)    PHYSICAL EXAM:    GENERAL: NAD, AAOx3   HEAD:  NC/AT  EYES: EOMI, PERRLA, no scleral icterus  HEENT: Moist mucous membranes  LUNG: Clear to auscultation bilaterally; No rales, rhonchi, wheezing, or rubs  HEART: RRR; No murmurs, rubs, or gallops  ABDOMEN: +BS, ST/ND/NT  EXTREMITIES:  2+ Peripheral Pulses, No clubbing, cyanosis, or edema  LAD: no palpable adenopathy    LABS:                        9.8    4.77  )-----------( 111      ( 2017 06:44 )             30.8     06-24    142  |  110<H>  |  22  ----------------------------<  81  4.4   |  17<L>  |  1.03    Ca    8.8      2017 06:44  Phos  3.2     06-24  Mg     1.3     -24    TPro  7.8  /  Alb  3.1<L>  /  TBili  0.4  /  DBili  x   /  AST  18  /  ALT  15  /  AlkPhos  136<H>  06-24    PT/INR - ( 2017 09:30 )   PT: 12.7 SEC;   INR: 1.13          PTT - ( 2017 09:30 )  PTT:33.4 SEC  Urinalysis Basic - ( 2017 11:10 )    Color: YELLOW / Appearance: CLEAR / S.010 / pH: 6.0  Gluc: NEGATIVE / Ketone: NEGATIVE  / Bili: NEGATIVE / Urobili: NORMAL E.U.   Blood: NEGATIVE / Protein: 20 / Nitrite: NEGATIVE   Leuk Esterase: MODERATE / RBC: 0-2 / WBC 10-25   Sq Epi: OCC / Non Sq Epi: x / Bacteria: x      Folate, Serum: 7.3 ng/mL ( @ 06:44)        RADIOLOGY & ADDITIONAL STUDIES:    EXAM:  CT BRAIN        PROCEDURE DATE:  2017         INTERPRETATION:  History stomach cancer. Headache.     Multiple axial sections were performed from base of skull to vertex   without contrast enhancement.    Parenchymal volume loss is seen.    There is no evidence of acute hemorrhage, mass, mass effect or acute   territorial infarct seen.    Evaluation of the osseous structures with the appropriate window appears   unremarkable    The visualized paranasal sinuses mastoid and middle ear regions appear   clear.    Impression: No evidence of acute hemorrhage, mass or mass effect.                  QUAN GUTIERREZ M.D., ATTENDING RADIOLOGIST  This document has been electronically signed. 2017 10:10AM              EXAM:  RAD CHEST PA LAT        PROCEDURE DATE:  2017         INTERPRETATION:  CLINICAL INDICATION: dizziness; gastric cancer status   post partial gastrectomy; history of prostatectomy and unilateral   nephrectomy    EXAM:  Frontal and lateral chest from 2017 at 0946. No prior chest x-ray   available at this institution for comparison.    IMPRESSION:  Multiple bilateral pulmonary nodules with basilar predominance suspicious   for metastatic disease. Largest in peripheral left lower lung measuring   approximately 3.1 cm in diameter. Chest CT advised to further assess.    No focal infiltrates, pleural effusions, pneumothorax, or pulmonary edema.    Cardiac and mediastinal silhouettes otherwise within normal limits.    Trachea midline.    Generalized osteopenia and mild spinal degenerative changes.    Discussed with ED attending Dr. Fenton on 2017 at 1155 with read   back.                   HUY BUSBY M.D., ATTENDING RADIOLOGIST

## 2017-06-24 NOTE — CONSULT NOTE ADULT - PROBLEM SELECTOR RECOMMENDATION 9
- will need records from [place] with full pathology to determine treatment course  - suggest CT chest abd pelvis with contrast if renal function is ok for full staging, will likely need to biopsy in house to determine primary - will need records from [place] with full pathology to determine treatment course  - suggest CT chest abd pelvis with contrast if renal function is ok for full staging. Once this is done will need to biopsy in house to determine primary and appropriate care. -  per family member bedside pt will likely return to [place], however should he not we can proceed with the following  -will need records from [place] with full pathology to determine treatment course  - suggest CT chest abd pelvis with contrast if renal function is ok for full staging. Once this is done will need to biopsy in house to determine primary and appropriate care. -  per family member bedside pt will not be returning to [place] and wants to have his care continued here.   -will need records from [place] with full pathology to determine treatment course  - suggest CT abd pelvis with contrast if renal function is ok for full staging. Once this is done will need to biopsy in house to determine primary and appropriate care.  -ldh, uric acid, PSA, testosterone. Pending CT a/p findings can add more tumor markers as indicated.  - HIV, Hepatitis.  -nutrition consult  -encourage ambulation  -may need GI consult for colonoscopy if nothing revealing on Ct a/p.   -  s/w consult regarding assistance with insurance.

## 2017-06-24 NOTE — PHYSICAL THERAPY INITIAL EVALUATION ADULT - PERTINENT HX OF CURRENT PROBLEM, REHAB EVAL
77 male, Uzbek speaking visiting from [place], [facility] gastric tumor (diagnosed 3 months ago in [place]) extending to kidney and intestine s/p partial gastrectomy and right nephrectomy (never received chemo or radiation), BPH s/p prostatectomy presents with generalized weakness, subjective fever and watery diarrhea for 3 days.

## 2017-06-24 NOTE — PHYSICAL THERAPY INITIAL EVALUATION ADULT - PLANNED THERAPY INTERVENTIONS, PT EVAL
Pt left sitting in chair in NAD; call bell in reach; +IV./bed mobility training/transfer training/strengthening/gait training

## 2017-06-25 LAB
ALBUMIN SERPL ELPH-MCNC: 3 G/DL — LOW (ref 3.3–5)
ALP SERPL-CCNC: 133 U/L — HIGH (ref 40–120)
ALT FLD-CCNC: 18 U/L — SIGNIFICANT CHANGE UP (ref 4–41)
AST SERPL-CCNC: 25 U/L — SIGNIFICANT CHANGE UP (ref 4–40)
BACTERIA UR CULT: SIGNIFICANT CHANGE UP
BASOPHILS # BLD AUTO: 0.01 K/UL — SIGNIFICANT CHANGE UP (ref 0–0.2)
BASOPHILS NFR BLD AUTO: 0.2 % — SIGNIFICANT CHANGE UP (ref 0–2)
BILIRUB SERPL-MCNC: 0.3 MG/DL — SIGNIFICANT CHANGE UP (ref 0.2–1.2)
BUN SERPL-MCNC: 22 MG/DL — SIGNIFICANT CHANGE UP (ref 7–23)
CALCIUM SERPL-MCNC: 8.3 MG/DL — LOW (ref 8.4–10.5)
CHLORIDE SERPL-SCNC: 106 MMOL/L — SIGNIFICANT CHANGE UP (ref 98–107)
CO2 SERPL-SCNC: 18 MMOL/L — LOW (ref 22–31)
CREAT SERPL-MCNC: 1.07 MG/DL — SIGNIFICANT CHANGE UP (ref 0.5–1.3)
EOSINOPHIL # BLD AUTO: 0.16 K/UL — SIGNIFICANT CHANGE UP (ref 0–0.5)
EOSINOPHIL NFR BLD AUTO: 3.5 % — SIGNIFICANT CHANGE UP (ref 0–6)
GLUCOSE SERPL-MCNC: 76 MG/DL — SIGNIFICANT CHANGE UP (ref 70–99)
HAV IGM SER-ACNC: NONREACTIVE — SIGNIFICANT CHANGE UP
HBV CORE IGM SER-ACNC: NONREACTIVE — SIGNIFICANT CHANGE UP
HBV SURFACE AG SER-ACNC: NONREACTIVE — SIGNIFICANT CHANGE UP
HCT VFR BLD CALC: 30.5 % — LOW (ref 39–50)
HCV AB S/CO SERPL IA: 0.17 S/CO — SIGNIFICANT CHANGE UP
HCV AB SERPL-IMP: SIGNIFICANT CHANGE UP
HGB BLD-MCNC: 9.9 G/DL — LOW (ref 13–17)
IMM GRANULOCYTES NFR BLD AUTO: 0.2 % — SIGNIFICANT CHANGE UP (ref 0–1.5)
LDH SERPL L TO P-CCNC: 86 U/L — LOW (ref 135–225)
LYMPHOCYTES # BLD AUTO: 1.47 K/UL — SIGNIFICANT CHANGE UP (ref 1–3.3)
LYMPHOCYTES # BLD AUTO: 32.2 % — SIGNIFICANT CHANGE UP (ref 13–44)
MCHC RBC-ENTMCNC: 30.6 PG — SIGNIFICANT CHANGE UP (ref 27–34)
MCHC RBC-ENTMCNC: 32.5 % — SIGNIFICANT CHANGE UP (ref 32–36)
MCV RBC AUTO: 94.1 FL — SIGNIFICANT CHANGE UP (ref 80–100)
MONOCYTES # BLD AUTO: 0.45 K/UL — SIGNIFICANT CHANGE UP (ref 0–0.9)
MONOCYTES NFR BLD AUTO: 9.9 % — SIGNIFICANT CHANGE UP (ref 2–14)
NEUTROPHILS # BLD AUTO: 2.46 K/UL — SIGNIFICANT CHANGE UP (ref 1.8–7.4)
NEUTROPHILS NFR BLD AUTO: 54 % — SIGNIFICANT CHANGE UP (ref 43–77)
PLATELET # BLD AUTO: 112 K/UL — LOW (ref 150–400)
PMV BLD: 9.1 FL — SIGNIFICANT CHANGE UP (ref 7–13)
POTASSIUM SERPL-MCNC: 4.2 MMOL/L — SIGNIFICANT CHANGE UP (ref 3.5–5.3)
POTASSIUM SERPL-SCNC: 4.2 MMOL/L — SIGNIFICANT CHANGE UP (ref 3.5–5.3)
PROT SERPL-MCNC: 7.5 G/DL — SIGNIFICANT CHANGE UP (ref 6–8.3)
PSA FLD-MCNC: 0.77 NG/ML — SIGNIFICANT CHANGE UP (ref 0–4)
RBC # BLD: 3.24 M/UL — LOW (ref 4.2–5.8)
RBC # FLD: 15.2 % — HIGH (ref 10.3–14.5)
SODIUM SERPL-SCNC: 138 MMOL/L — SIGNIFICANT CHANGE UP (ref 135–145)
SPECIMEN SOURCE: SIGNIFICANT CHANGE UP
TESTOST FREE+TOTAL PANEL SERPL-MCNC: 551.4 NG/DL — SIGNIFICANT CHANGE UP (ref 193–740)
WBC # BLD: 4.56 K/UL — SIGNIFICANT CHANGE UP (ref 3.8–10.5)
WBC # FLD AUTO: 4.56 K/UL — SIGNIFICANT CHANGE UP (ref 3.8–10.5)

## 2017-06-25 PROCEDURE — 72100 X-RAY EXAM L-S SPINE 2/3 VWS: CPT | Mod: 26

## 2017-06-25 PROCEDURE — 72074 X-RAY EXAM THORAC SPINE4/>VW: CPT | Mod: 26

## 2017-06-25 RX ADMIN — SODIUM CHLORIDE 75 MILLILITER(S): 9 INJECTION INTRAMUSCULAR; INTRAVENOUS; SUBCUTANEOUS at 05:19

## 2017-06-25 RX ADMIN — HEPARIN SODIUM 5000 UNIT(S): 5000 INJECTION INTRAVENOUS; SUBCUTANEOUS at 15:08

## 2017-06-25 RX ADMIN — SODIUM CHLORIDE 75 MILLILITER(S): 9 INJECTION INTRAMUSCULAR; INTRAVENOUS; SUBCUTANEOUS at 15:08

## 2017-06-25 RX ADMIN — HEPARIN SODIUM 5000 UNIT(S): 5000 INJECTION INTRAVENOUS; SUBCUTANEOUS at 21:19

## 2017-06-25 RX ADMIN — Medication 250 MILLIGRAM(S): at 19:09

## 2017-06-25 RX ADMIN — HEPARIN SODIUM 5000 UNIT(S): 5000 INJECTION INTRAVENOUS; SUBCUTANEOUS at 05:17

## 2017-06-25 NOTE — DIETITIAN INITIAL EVALUATION ADULT. - OTHER INFO
Pt referred for nutrition consult 2/2 assessment and education.  At this time, pt is eating well as per family members. RN confirms pt had at least 50% of breakfast meal this morning.  Family denies food allergies.  No difficulties chewing/swallowing at this time.

## 2017-06-25 NOTE — CHART NOTE - NSCHARTNOTEFT_GEN_A_CORE
NUTRITION SERVICES     Upon Nutritional Assessment by the Registered Dietitian your patient was determined to meet criteria/ has evidence of the following diagnosis/diagnoses:  [ ] Mild Protein Calorie Malnutrition   [ ] Moderate Protein Calorie Malnutrition   [X ] Severe Protein Calorie Malnutrition   [ ] Unspecified Protein Calorie Malnutrition   [ ] Underweight / BMI <19  [ ] Morbid Obesity / BMI >40    Findings as based on:  •  Comprehensive nutrition assessment and consultation

## 2017-06-25 NOTE — PROGRESS NOTE ADULT - ASSESSMENT
78 yo M as above:  1. Diarrhea - possibly dumping syndrome in the setting of bowel resection vs infectious, as improving with antibiotics, f/u C diff, O/P  2. UTI - c/w Cipro  3. YONY - resolved, c/w IVF  4. Metastatic gastric CA - CT C/A/P as per Onc, if pt staying in [place] for treatment will get biopsy after, records from [place] to be brought by daughter, f/u Onc  5. Anemia - trend Hb, transfuse PRN <7, no active bleeding  6. DVT prophylaxis

## 2017-06-26 LAB
ALBUMIN SERPL ELPH-MCNC: 3 G/DL — LOW (ref 3.3–5)
ALP SERPL-CCNC: 147 U/L — HIGH (ref 40–120)
ALT FLD-CCNC: 32 U/L — SIGNIFICANT CHANGE UP (ref 4–41)
AST SERPL-CCNC: 37 U/L — SIGNIFICANT CHANGE UP (ref 4–40)
BASOPHILS # BLD AUTO: 0.01 K/UL — SIGNIFICANT CHANGE UP (ref 0–0.2)
BASOPHILS NFR BLD AUTO: 0.2 % — SIGNIFICANT CHANGE UP (ref 0–2)
BILIRUB SERPL-MCNC: 0.4 MG/DL — SIGNIFICANT CHANGE UP (ref 0.2–1.2)
BUN SERPL-MCNC: 25 MG/DL — HIGH (ref 7–23)
CALCIUM SERPL-MCNC: 8.9 MG/DL — SIGNIFICANT CHANGE UP (ref 8.4–10.5)
CHLORIDE SERPL-SCNC: 107 MMOL/L — SIGNIFICANT CHANGE UP (ref 98–107)
CO2 SERPL-SCNC: 20 MMOL/L — LOW (ref 22–31)
CREAT SERPL-MCNC: 1.12 MG/DL — SIGNIFICANT CHANGE UP (ref 0.5–1.3)
EOSINOPHIL # BLD AUTO: 0.16 K/UL — SIGNIFICANT CHANGE UP (ref 0–0.5)
EOSINOPHIL NFR BLD AUTO: 3.3 % — SIGNIFICANT CHANGE UP (ref 0–6)
GLUCOSE SERPL-MCNC: 83 MG/DL — SIGNIFICANT CHANGE UP (ref 70–99)
HCT VFR BLD CALC: 30.5 % — LOW (ref 39–50)
HGB BLD-MCNC: 9.9 G/DL — LOW (ref 13–17)
IMM GRANULOCYTES NFR BLD AUTO: 0.2 % — SIGNIFICANT CHANGE UP (ref 0–1.5)
LYMPHOCYTES # BLD AUTO: 1.74 K/UL — SIGNIFICANT CHANGE UP (ref 1–3.3)
LYMPHOCYTES # BLD AUTO: 35.4 % — SIGNIFICANT CHANGE UP (ref 13–44)
MAGNESIUM SERPL-MCNC: 1.3 MG/DL — LOW (ref 1.6–2.6)
MCHC RBC-ENTMCNC: 30.5 PG — SIGNIFICANT CHANGE UP (ref 27–34)
MCHC RBC-ENTMCNC: 32.5 % — SIGNIFICANT CHANGE UP (ref 32–36)
MCV RBC AUTO: 93.8 FL — SIGNIFICANT CHANGE UP (ref 80–100)
MONOCYTES # BLD AUTO: 0.66 K/UL — SIGNIFICANT CHANGE UP (ref 0–0.9)
MONOCYTES NFR BLD AUTO: 13.4 % — SIGNIFICANT CHANGE UP (ref 2–14)
NEUTROPHILS # BLD AUTO: 2.33 K/UL — SIGNIFICANT CHANGE UP (ref 1.8–7.4)
NEUTROPHILS NFR BLD AUTO: 47.5 % — SIGNIFICANT CHANGE UP (ref 43–77)
PLATELET # BLD AUTO: 109 K/UL — LOW (ref 150–400)
PMV BLD: 9.1 FL — SIGNIFICANT CHANGE UP (ref 7–13)
POTASSIUM SERPL-MCNC: 4.3 MMOL/L — SIGNIFICANT CHANGE UP (ref 3.5–5.3)
POTASSIUM SERPL-SCNC: 4.3 MMOL/L — SIGNIFICANT CHANGE UP (ref 3.5–5.3)
PROT SERPL-MCNC: 7.5 G/DL — SIGNIFICANT CHANGE UP (ref 6–8.3)
RBC # BLD: 3.25 M/UL — LOW (ref 4.2–5.8)
RBC # FLD: 15.1 % — HIGH (ref 10.3–14.5)
SODIUM SERPL-SCNC: 140 MMOL/L — SIGNIFICANT CHANGE UP (ref 135–145)
WBC # BLD: 4.91 K/UL — SIGNIFICANT CHANGE UP (ref 3.8–10.5)
WBC # FLD AUTO: 4.91 K/UL — SIGNIFICANT CHANGE UP (ref 3.8–10.5)

## 2017-06-26 RX ORDER — MAGNESIUM SULFATE 500 MG/ML
1 VIAL (ML) INJECTION ONCE
Qty: 0 | Refills: 0 | Status: COMPLETED | OUTPATIENT
Start: 2017-06-26 | End: 2017-06-26

## 2017-06-26 RX ADMIN — Medication 100 GRAM(S): at 14:39

## 2017-06-26 RX ADMIN — HEPARIN SODIUM 5000 UNIT(S): 5000 INJECTION INTRAVENOUS; SUBCUTANEOUS at 05:30

## 2017-06-26 RX ADMIN — Medication 250 MILLIGRAM(S): at 18:38

## 2017-06-26 RX ADMIN — HEPARIN SODIUM 5000 UNIT(S): 5000 INJECTION INTRAVENOUS; SUBCUTANEOUS at 14:40

## 2017-06-26 RX ADMIN — HEPARIN SODIUM 5000 UNIT(S): 5000 INJECTION INTRAVENOUS; SUBCUTANEOUS at 21:59

## 2017-06-26 NOTE — PROGRESS NOTE ADULT - ASSESSMENT
76 yo Male as above:  1. Diarrhea - possibly dumping syndrome in the setting of bowel resection vs infectious, as improving with antibiotics, f/u C diff, O/P  2. UTI - c/w Cipro  3. YONY - resolved, c/w IVF  4. Metastatic gastric CA - CT C/A/P as per Onc, if pt staying in [place] for treatment will likely need bx , records from [place] to be brought by daughter, f/u Onc  5. Anemia - trend Hb, transfuse PRN <7, no active bleeding  6. DVT prophylaxis  gi/ neurosx evals   heme/onc f/u

## 2017-06-27 LAB
ALBUMIN SERPL ELPH-MCNC: 2.9 G/DL — LOW (ref 3.3–5)
ALP SERPL-CCNC: 132 U/L — HIGH (ref 40–120)
ALT FLD-CCNC: 29 U/L — SIGNIFICANT CHANGE UP (ref 4–41)
AST SERPL-CCNC: 31 U/L — SIGNIFICANT CHANGE UP (ref 4–40)
BILIRUB SERPL-MCNC: 0.4 MG/DL — SIGNIFICANT CHANGE UP (ref 0.2–1.2)
BUN SERPL-MCNC: 27 MG/DL — HIGH (ref 7–23)
CALCIUM SERPL-MCNC: 9.2 MG/DL — SIGNIFICANT CHANGE UP (ref 8.4–10.5)
CHLORIDE SERPL-SCNC: 107 MMOL/L — SIGNIFICANT CHANGE UP (ref 98–107)
CO2 SERPL-SCNC: 19 MMOL/L — LOW (ref 22–31)
CREAT SERPL-MCNC: 1.06 MG/DL — SIGNIFICANT CHANGE UP (ref 0.5–1.3)
GLUCOSE SERPL-MCNC: 79 MG/DL — SIGNIFICANT CHANGE UP (ref 70–99)
HCT VFR BLD CALC: 30.1 % — LOW (ref 39–50)
HGB BLD-MCNC: 9.7 G/DL — LOW (ref 13–17)
INR BLD: 1.09 — SIGNIFICANT CHANGE UP (ref 0.88–1.17)
MAGNESIUM SERPL-MCNC: 1.6 MG/DL — SIGNIFICANT CHANGE UP (ref 1.6–2.6)
MCHC RBC-ENTMCNC: 30.3 PG — SIGNIFICANT CHANGE UP (ref 27–34)
MCHC RBC-ENTMCNC: 32.2 % — SIGNIFICANT CHANGE UP (ref 32–36)
MCV RBC AUTO: 94.1 FL — SIGNIFICANT CHANGE UP (ref 80–100)
PHOSPHATE SERPL-MCNC: 2.9 MG/DL — SIGNIFICANT CHANGE UP (ref 2.5–4.5)
PLATELET # BLD AUTO: 107 K/UL — LOW (ref 150–400)
PMV BLD: 9.7 FL — SIGNIFICANT CHANGE UP (ref 7–13)
POTASSIUM SERPL-MCNC: 4.5 MMOL/L — SIGNIFICANT CHANGE UP (ref 3.5–5.3)
POTASSIUM SERPL-SCNC: 4.5 MMOL/L — SIGNIFICANT CHANGE UP (ref 3.5–5.3)
PROT SERPL-MCNC: 7.3 G/DL — SIGNIFICANT CHANGE UP (ref 6–8.3)
PROTHROM AB SERPL-ACNC: 12.2 SEC — SIGNIFICANT CHANGE UP (ref 9.8–13.1)
RBC # BLD: 3.2 M/UL — LOW (ref 4.2–5.8)
RBC # FLD: 15.3 % — HIGH (ref 10.3–14.5)
SODIUM SERPL-SCNC: 138 MMOL/L — SIGNIFICANT CHANGE UP (ref 135–145)
WBC # BLD: 4.77 K/UL — SIGNIFICANT CHANGE UP (ref 3.8–10.5)
WBC # FLD AUTO: 4.77 K/UL — SIGNIFICANT CHANGE UP (ref 3.8–10.5)

## 2017-06-27 RX ADMIN — HEPARIN SODIUM 5000 UNIT(S): 5000 INJECTION INTRAVENOUS; SUBCUTANEOUS at 05:20

## 2017-06-27 RX ADMIN — HEPARIN SODIUM 5000 UNIT(S): 5000 INJECTION INTRAVENOUS; SUBCUTANEOUS at 13:02

## 2017-06-27 RX ADMIN — SODIUM CHLORIDE 75 MILLILITER(S): 9 INJECTION INTRAMUSCULAR; INTRAVENOUS; SUBCUTANEOUS at 05:20

## 2017-06-27 RX ADMIN — Medication 250 MILLIGRAM(S): at 18:08

## 2017-06-27 NOTE — PROGRESS NOTE ADULT - ASSESSMENT
78 yo Male as above:  1. Diarrhea -better as per pt   2. UTI - c/w Cipro/limit 5 day course  3. YONY - resolved, c/w IVF  4. Metastatic gastric CA - CT C/A/P as per Onc, bx today w/ ir  , records from [place] to be brought by daughter, f/u Onc  5. Anemia - trend Hb, transfuse PRN <7, no active bleeding  6. DVT prophylaxis  gi/ eval  orthospine eval noted no sx   heme/onc f/u for further recs

## 2017-06-27 NOTE — CONSULT NOTE ADULT - SUBJECTIVE AND OBJECTIVE BOX
HPI:   This is a 77yMale with  who presents today with chief complaint of malaise. CT of abd reveal incidental finding of L2 lesion. Pt recently underwent gastrectomy for gastric CA. Has intermittant radicular pain, but is not limited by pain. Pain usually only occurs w/distance walking. No contributory bowel/bladder complaints. No saddle anesthesia.       T(C): 36.9 (06-27-17 @ 05:18), Max: 36.9 (06-27-17 @ 05:18)  HR: 79 (06-27-17 @ 05:18) (59 - 79)  BP: 102/50 (06-27-17 @ 05:18) (102/50 - 108/56)  RR: 18 (06-27-17 @ 05:18) (16 - 18)  SpO2: 97% (06-27-17 @ 05:18) (97% - 100%)  Wt(kg): --                          9.7    4.77  )-----------( 107      ( 27 Jun 2017 06:53 )             30.1     06-27    138  |  107  |  27<H>  ----------------------------<  79  4.5   |  19<L>  |  1.06    Ca    9.2      27 Jun 2017 06:53  Phos  2.9     06-27  Mg     1.6     06-27    TPro  7.3  /  Alb  2.9<L>  /  TBili  0.4  /  DBili  x   /  AST  31  /  ALT  29  /  AlkPhos  132<H>  06-27    CT abd: L2 lesion, contained w/in vertebral body without visible extension  XR L spine: no acute fx/dislocation    EXAM:  Awake, Alert and in no acute distress  Pleasant and cooperative.  - B/L LE: SILT M/L/FDWS foot; 5/5 hip/quad/TA/EHL/gs; no clonus/Babinski    A/P: This is a 77y Male who presents today with L2 lesion    - Full neuraxial imaging  - Pain control  - WBAT - PT/OOB  - No acute orthopedic intervention  - Can F/U w/Dr Brennan as outpatient [phone]

## 2017-06-28 ENCOUNTER — RESULT REVIEW (OUTPATIENT)
Age: 77
End: 2017-06-28

## 2017-06-28 PROCEDURE — 88173 CYTOPATH EVAL FNA REPORT: CPT | Mod: 26

## 2017-06-28 PROCEDURE — 32405: CPT

## 2017-06-28 PROCEDURE — 71010: CPT | Mod: 26,77

## 2017-06-28 PROCEDURE — 77012 CT SCAN FOR NEEDLE BIOPSY: CPT | Mod: 26

## 2017-06-28 PROCEDURE — 88342 IMHCHEM/IMCYTCHM 1ST ANTB: CPT | Mod: 26

## 2017-06-28 PROCEDURE — 71010: CPT | Mod: 26

## 2017-06-28 PROCEDURE — 88305 TISSUE EXAM BY PATHOLOGIST: CPT | Mod: 26

## 2017-06-28 PROCEDURE — 88341 IMHCHEM/IMCYTCHM EA ADD ANTB: CPT | Mod: 26

## 2017-06-28 RX ADMIN — Medication 250 MILLIGRAM(S): at 18:03

## 2017-06-28 RX ADMIN — HEPARIN SODIUM 5000 UNIT(S): 5000 INJECTION INTRAVENOUS; SUBCUTANEOUS at 22:38

## 2017-06-29 LAB
ALBUMIN SERPL ELPH-MCNC: 3.1 G/DL — LOW (ref 3.3–5)
ALP SERPL-CCNC: 154 U/L — HIGH (ref 40–120)
ALT FLD-CCNC: 32 U/L — SIGNIFICANT CHANGE UP (ref 4–41)
AST SERPL-CCNC: 31 U/L — SIGNIFICANT CHANGE UP (ref 4–40)
BILIRUB SERPL-MCNC: 0.4 MG/DL — SIGNIFICANT CHANGE UP (ref 0.2–1.2)
BUN SERPL-MCNC: 29 MG/DL — HIGH (ref 7–23)
CALCIUM SERPL-MCNC: 9.1 MG/DL — SIGNIFICANT CHANGE UP (ref 8.4–10.5)
CHLORIDE SERPL-SCNC: 104 MMOL/L — SIGNIFICANT CHANGE UP (ref 98–107)
CO2 SERPL-SCNC: 20 MMOL/L — LOW (ref 22–31)
CREAT SERPL-MCNC: 1.08 MG/DL — SIGNIFICANT CHANGE UP (ref 0.5–1.3)
GLUCOSE SERPL-MCNC: 77 MG/DL — SIGNIFICANT CHANGE UP (ref 70–99)
HCT VFR BLD CALC: 29 % — LOW (ref 39–50)
HGB BLD-MCNC: 9.6 G/DL — LOW (ref 13–17)
MAGNESIUM SERPL-MCNC: 1.3 MG/DL — LOW (ref 1.6–2.6)
MCHC RBC-ENTMCNC: 31.4 PG — SIGNIFICANT CHANGE UP (ref 27–34)
MCHC RBC-ENTMCNC: 33.1 % — SIGNIFICANT CHANGE UP (ref 32–36)
MCV RBC AUTO: 94.8 FL — SIGNIFICANT CHANGE UP (ref 80–100)
NRBC # FLD: 0 — SIGNIFICANT CHANGE UP
PHOSPHATE SERPL-MCNC: 3.7 MG/DL — SIGNIFICANT CHANGE UP (ref 2.5–4.5)
PLATELET # BLD AUTO: 109 K/UL — LOW (ref 150–400)
PMV BLD: 9.4 FL — SIGNIFICANT CHANGE UP (ref 7–13)
POTASSIUM SERPL-MCNC: 4.6 MMOL/L — SIGNIFICANT CHANGE UP (ref 3.5–5.3)
POTASSIUM SERPL-SCNC: 4.6 MMOL/L — SIGNIFICANT CHANGE UP (ref 3.5–5.3)
PROT SERPL-MCNC: 7.6 G/DL — SIGNIFICANT CHANGE UP (ref 6–8.3)
RBC # BLD: 3.06 M/UL — LOW (ref 4.2–5.8)
RBC # FLD: 15.1 % — HIGH (ref 10.3–14.5)
SODIUM SERPL-SCNC: 137 MMOL/L — SIGNIFICANT CHANGE UP (ref 135–145)
WBC # BLD: 5.24 K/UL — SIGNIFICANT CHANGE UP (ref 3.8–10.5)
WBC # FLD AUTO: 5.24 K/UL — SIGNIFICANT CHANGE UP (ref 3.8–10.5)

## 2017-06-29 PROCEDURE — 71010: CPT | Mod: 26

## 2017-06-29 PROCEDURE — 72146 MRI CHEST SPINE W/O DYE: CPT | Mod: 26

## 2017-06-29 PROCEDURE — 72141 MRI NECK SPINE W/O DYE: CPT | Mod: 26

## 2017-06-29 PROCEDURE — 72148 MRI LUMBAR SPINE W/O DYE: CPT | Mod: 26

## 2017-06-29 PROCEDURE — 70551 MRI BRAIN STEM W/O DYE: CPT | Mod: 26

## 2017-06-29 RX ORDER — MAGNESIUM OXIDE 400 MG ORAL TABLET 241.3 MG
400 TABLET ORAL
Qty: 0 | Refills: 0 | Status: DISCONTINUED | OUTPATIENT
Start: 2017-06-29 | End: 2017-07-01

## 2017-06-29 RX ORDER — MAGNESIUM SULFATE 500 MG/ML
1 VIAL (ML) INJECTION ONCE
Qty: 0 | Refills: 0 | Status: COMPLETED | OUTPATIENT
Start: 2017-06-29 | End: 2017-06-29

## 2017-06-29 RX ADMIN — Medication 100 GRAM(S): at 11:59

## 2017-06-29 RX ADMIN — HEPARIN SODIUM 5000 UNIT(S): 5000 INJECTION INTRAVENOUS; SUBCUTANEOUS at 23:14

## 2017-06-29 RX ADMIN — MAGNESIUM OXIDE 400 MG ORAL TABLET 400 MILLIGRAM(S): 241.3 TABLET ORAL at 17:55

## 2017-06-29 RX ADMIN — Medication 250 MILLIGRAM(S): at 18:07

## 2017-06-29 RX ADMIN — HEPARIN SODIUM 5000 UNIT(S): 5000 INJECTION INTRAVENOUS; SUBCUTANEOUS at 14:07

## 2017-06-29 RX ADMIN — MAGNESIUM OXIDE 400 MG ORAL TABLET 400 MILLIGRAM(S): 241.3 TABLET ORAL at 11:59

## 2017-06-29 RX ADMIN — HEPARIN SODIUM 5000 UNIT(S): 5000 INJECTION INTRAVENOUS; SUBCUTANEOUS at 05:21

## 2017-06-29 NOTE — PROGRESS NOTE ADULT - ASSESSMENT
78 yo Male as above:  1. Diarrhea -better as per pt   2. UTI - limit abs   3. YONY - resolved, c/w IVF  4. Metastatic gastric CA - CT C/A/P noted / bx/ onc f/u  , records from [place] to be brought by daughter, f/u Onc if ok after bx can f/u as outpt w/ heme onc  5. Anemia - trend Hb, transfuse PRN <7, no active bleeding  6. DVT prophylaxis  orthospine eval noted no sx   heme/onc f/u for further recs

## 2017-06-29 NOTE — CHART NOTE - NSCHARTNOTEFT_GEN_A_CORE
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  Called ortho spine to inform the same- Will await Ortho recommendations Called by radiology with MRI L spine results- Incidental finding of Hemangioma at L2 disc.  Called ortho spine to inform the same  per Ortho no interventions - will continue to monitor

## 2017-06-30 PROCEDURE — 99233 SBSQ HOSP IP/OBS HIGH 50: CPT | Mod: GC

## 2017-06-30 RX ORDER — HYDROCORTISONE 1 %
1 OINTMENT (GRAM) TOPICAL
Qty: 0 | Refills: 0 | COMMUNITY
Start: 2017-06-30

## 2017-06-30 RX ADMIN — MAGNESIUM OXIDE 400 MG ORAL TABLET 400 MILLIGRAM(S): 241.3 TABLET ORAL at 12:34

## 2017-06-30 RX ADMIN — HEPARIN SODIUM 5000 UNIT(S): 5000 INJECTION INTRAVENOUS; SUBCUTANEOUS at 16:27

## 2017-06-30 RX ADMIN — HEPARIN SODIUM 5000 UNIT(S): 5000 INJECTION INTRAVENOUS; SUBCUTANEOUS at 05:25

## 2017-06-30 RX ADMIN — MAGNESIUM OXIDE 400 MG ORAL TABLET 400 MILLIGRAM(S): 241.3 TABLET ORAL at 08:47

## 2017-06-30 RX ADMIN — Medication 250 MILLIGRAM(S): at 17:37

## 2017-06-30 RX ADMIN — HEPARIN SODIUM 5000 UNIT(S): 5000 INJECTION INTRAVENOUS; SUBCUTANEOUS at 22:00

## 2017-06-30 RX ADMIN — MAGNESIUM OXIDE 400 MG ORAL TABLET 400 MILLIGRAM(S): 241.3 TABLET ORAL at 17:37

## 2017-06-30 NOTE — DISCHARGE NOTE ADULT - CARE PLAN
Principal Discharge DX:	Fever, unspecified fever cause  Goal:	resolved  Instructions for follow-up, activity and diet:	your fever resolved when the source was treated  Secondary Diagnosis:	Urinary tract infection without hematuria, site unspecified  Instructions for follow-up, activity and diet:	You completed a course of antibiotics during your stay at the hospital  Secondary Diagnosis:	Metastatic cancer  Instructions for follow-up, activity and diet:	You must follow up with [facility] at [facility] within 1 week for review of you pathology reports Principal Discharge DX:	Fever, unspecified fever cause  Goal:	resolved  Instructions for follow-up, activity and diet:	your fever resolved when the source was treated  Secondary Diagnosis:	Urinary tract infection without hematuria, site unspecified  Instructions for follow-up, activity and diet:	You completed a course of antibiotics during your stay at the hospital  Follow up with your PCP if any symptoms persist  Secondary Diagnosis:	Metastatic cancer  Instructions for follow-up, activity and diet:	You must follow up with [facility] at [facility] within 1 week for review of you pathology reports Principal Discharge DX:	Fever, unspecified fever cause  Goal:	resolved  Instructions for follow-up, activity and diet:	your fever resolved when the source was treated  Secondary Diagnosis:	Urinary tract infection without hematuria, site unspecified  Instructions for follow-up, activity and diet:	You completed a course of antibiotics during your stay at the hospital  Follow up with your PCP if any symptoms persist  Secondary Diagnosis:	Metastatic cancer  Instructions for follow-up, activity and diet:	You must follow up with [facility] at [facility] within 1 week for review of you pathology reports Principal Discharge DX:	Fever, unspecified fever cause  Goal:	resolved  Instructions for follow-up, activity and diet:	your fever resolved when the source was treated  Secondary Diagnosis:	Urinary tract infection without hematuria, site unspecified  Instructions for follow-up, activity and diet:	You completed a course of antibiotics during your stay at the hospital  Follow up with your PCP if any symptoms persist  Secondary Diagnosis:	Metastatic cancer  Instructions for follow-up, activity and diet:	You must follow up with [facility] at [facility] within 1 week for review of you pathology reports

## 2017-06-30 NOTE — PROGRESS NOTE ADULT - PROBLEM SELECTOR PLAN 1
Patient with metastatic disease, per [facility] records. Patient with Renal cell carcinoma, clear cell type.   -Biopsy done, pending results.

## 2017-06-30 NOTE — DISCHARGE NOTE ADULT - PLAN OF CARE
resolved your fever resolved when the source was treated You completed a course of antibiotics during your stay at the hospital You must follow up with [facility] at [facility] within 1 week for review of you pathology reports You completed a course of antibiotics during your stay at the hospital  Follow up with your PCP if any symptoms persist

## 2017-06-30 NOTE — DISCHARGE NOTE ADULT - HOSPITAL COURSE
77 male, Chinese speaking visiting from [place], [facility] gastric tumor (diagnosed 3 months ago in [place]) extending to kidney and intestine s/p partial gastrectomy and right nephrectomy (never received chemo or radiation), BPH s/p prostatectomy presents with generalized weakness, subjective fever and watery diarrhea for 3 days. No recent use of antibiotics, no blood in stool but reports stool is black color. No abdominal pain or back pain but complains of itchiness of back. Complains of headaches and dizziness mainly with walking and b/l feet pain that radiates up to head.  Diarrhea following gastrointestinal surgery.   -Plan: ? dumping syndrome  -Nutrition consult      UTI  f.u urine cx  -cipro x 3 days    YONY  -IVF hydration, monitor Cr, avoid nephrotoxic meds including NSAIDs.     Pulmonary nodules.    -CT chest without contrast to better evaluate, may need biopsy  -Pulmonary consult for possible biopsy of pulmonary nodule after CT chest done.   -CT chest/abd/pelvis- Multiple bilateral pulmonary nodules and masses consistent with metastatic disease. Abnormal lymph nodes and peritoneal nodules, as above. Suspicious lesion and L2 vertebral body.    MRI L spine- incidental finding of hemangioma   Ortho spine c/s- no intervention     Metastatic cancer.    -gastric ca with mets to kidney and small bowel s/p right nephrectomy, partial gastrectomy and complicated by intestinal obstruction s/p surgery  -CT head negative,   -private onc on board    Malignant neoplasm of stomach  -s/p partial gastrectomy.    BPH 77 male, Persian speaking visiting from [place], [facility] gastric tumor (diagnosed 3 months ago in [place]) extending to kidney and intestine s/p partial gastrectomy and right nephrectomy (never received chemo or radiation), BPH s/p prostatectomy presents with generalized weakness, subjective fever and watery diarrhea for 3 days. No recent use of antibiotics, no blood in stool but reports stool is black color. No abdominal pain or back pain but complains of itchiness of back. Complains of headaches and dizziness mainly with walking and b/l feet pain that radiates up to head.  Diarrhea following gastrointestinal surgery.   -Plan: ? dumping syndrome  -Nutrition consult    UTI f.u urine cx-cipro x 3 days -- completed    YONY -IVF hydration, monitor Cr, avoid nephrotoxic meds including NSAIDs.     Pulmonary nodules.    -CT chest without contrast to better evaluate, may need biopsy  -Pulmonary consult for possible biopsy of pulmonary nodule after CT chest done.   -CT chest/abd/pelvis- Multiple bilateral pulmonary nodules and masses consistent with metastatic disease. Abnormal lymph nodes and peritoneal nodules, as above. Suspicious lesion and L2 vertebral body.  6/28: biopsy - f/u with oncology for pathology and further treatment    MRI L spine- incidental finding of hemangioma   Ortho spine c/s- no intervention     Metastatic cancer.    -gastric ca with mets to kidney and small bowel s/p right nephrectomy, partial gastrectomy and complicated by intestinal obstruction s/p surgery  -CT head negative,   -private onc on board    Malignant neoplasm of stomach  -s/p partial gastrectomy.     dispo: home 77 male, Malay speaking visiting from [place], [facility] gastric tumor (diagnosed 3 months ago in [place]) extending to kidney and intestine s/p partial gastrectomy and right nephrectomy (never received chemo or radiation), BPH s/p prostatectomy presents with generalized weakness, subjective fever and watery diarrhea for 3 days. No recent use of antibiotics, no blood in stool but reports stool is black color. No abdominal pain or back pain but complains of itchiness of back. Complains of headaches and dizziness mainly with walking and b/l feet pain that radiates up to head.  Diarrhea following gastrointestinal surgery.   -Plan: ? dumping syndrome  -Nutrition consult    UTI f.u urine cx-cipro x 3 days -- completed    YONY -IVF hydration, monitor Cr, avoid nephrotoxic meds including NSAIDs.     Pulmonary nodules.    -CT chest without contrast to better evaluate, may need biopsy  -Pulmonary consult for possible biopsy of pulmonary nodule after CT chest done.   -CT chest/abd/pelvis- Multiple bilateral pulmonary nodules and masses consistent with metastatic disease. Abnormal lymph nodes and peritoneal nodules, as above. Suspicious lesion and L2 vertebral body.  6/28: biopsy - f/u with oncology for pathology and further treatment    MRI L spine- incidental finding of hemangioma   Ortho spine c/s- no intervention     Metastatic cancer.    -gastric ca with mets to kidney and small bowel s/p right nephrectomy, partial gastrectomy and complicated by intestinal obstruction s/p surgery  -CT head negative,   -private onc on board    Malignant neoplasm of stomach  -s/p partial gastrectomy.    dispo: home

## 2017-06-30 NOTE — DISCHARGE NOTE ADULT - CARE PROVIDER_API CALL
Oncology - [facility],   [facility]  Phone: [phone]  Fax: (   )    -

## 2017-06-30 NOTE — PROGRESS NOTE ADULT - ASSESSMENT
77 male with stage IV  Renal cell carcinoma, Clear cell type s/p right side nephrectomy, right sided hemicolectomy, colonic fistula in 5/2017 done in [place].  Patient is treatment naive p/w fever diarrhea, with CT chest concerning for metastatic disease.

## 2017-06-30 NOTE — DISCHARGE NOTE ADULT - PROVIDER TOKENS
FREE:[LAST:[Oncology - [facility]],PHONE:[phone]],FAX:[phone]   [phone]    -],ADDRESS:[[facility]]]

## 2017-06-30 NOTE — PROGRESS NOTE ADULT - ASSESSMENT
76 yo Male as above:  1. Diarrhea - resolved  2. UTI - c/w PO Cipro  3. YONY - resolved, c/w IVF  4. Metastatic gastric CA - s/p biopsy, pt to follow with Oncology  5. Anemia - trend Hb, transfuse PRN <7, no active bleeding  6. DVT prophylaxis  7. Dispo - may d/c home today if no objection from Oncology

## 2017-06-30 NOTE — PROGRESS NOTE ADULT - ATTENDING COMMENTS
Patient with h/o clear cell RCC with lung, peritoneal and ? bone mets. S/p lung bx. Await path. F/u as outpt to discuss TKI treatment.

## 2017-06-30 NOTE — DISCHARGE NOTE ADULT - PATIENT PORTAL LINK FT
“You can access the [facility] Patient Portal, offered by [facility], by registering with the following website: [email or web address]”

## 2017-07-01 VITALS
RESPIRATION RATE: 18 BRPM | SYSTOLIC BLOOD PRESSURE: 110 MMHG | HEART RATE: 73 BPM | OXYGEN SATURATION: 97 % | DIASTOLIC BLOOD PRESSURE: 63 MMHG | TEMPERATURE: 98 F

## 2017-07-01 LAB
ALBUMIN SERPL ELPH-MCNC: 3.4 G/DL — SIGNIFICANT CHANGE UP (ref 3.3–5)
ALP SERPL-CCNC: 170 U/L — HIGH (ref 40–120)
ALT FLD-CCNC: 34 U/L — SIGNIFICANT CHANGE UP (ref 4–41)
AST SERPL-CCNC: 31 U/L — SIGNIFICANT CHANGE UP (ref 4–40)
BILIRUB SERPL-MCNC: 0.5 MG/DL — SIGNIFICANT CHANGE UP (ref 0.2–1.2)
BUN SERPL-MCNC: 36 MG/DL — HIGH (ref 7–23)
CALCIUM SERPL-MCNC: 9.3 MG/DL — SIGNIFICANT CHANGE UP (ref 8.4–10.5)
CHLORIDE SERPL-SCNC: 100 MMOL/L — SIGNIFICANT CHANGE UP (ref 98–107)
CO2 SERPL-SCNC: 21 MMOL/L — LOW (ref 22–31)
CREAT SERPL-MCNC: 1.07 MG/DL — SIGNIFICANT CHANGE UP (ref 0.5–1.3)
GLUCOSE SERPL-MCNC: 81 MG/DL — SIGNIFICANT CHANGE UP (ref 70–99)
HCT VFR BLD CALC: 31.4 % — LOW (ref 39–50)
HGB BLD-MCNC: 10.1 G/DL — LOW (ref 13–17)
MAGNESIUM SERPL-MCNC: 1.8 MG/DL — SIGNIFICANT CHANGE UP (ref 1.6–2.6)
MCHC RBC-ENTMCNC: 30.3 PG — SIGNIFICANT CHANGE UP (ref 27–34)
MCHC RBC-ENTMCNC: 32.2 % — SIGNIFICANT CHANGE UP (ref 32–36)
MCV RBC AUTO: 94.3 FL — SIGNIFICANT CHANGE UP (ref 80–100)
NRBC # FLD: 0 — SIGNIFICANT CHANGE UP
PHOSPHATE SERPL-MCNC: 3 MG/DL — SIGNIFICANT CHANGE UP (ref 2.5–4.5)
PLATELET # BLD AUTO: 104 K/UL — LOW (ref 150–400)
PMV BLD: 9.8 FL — SIGNIFICANT CHANGE UP (ref 7–13)
POTASSIUM SERPL-MCNC: 5 MMOL/L — SIGNIFICANT CHANGE UP (ref 3.5–5.3)
POTASSIUM SERPL-SCNC: 5 MMOL/L — SIGNIFICANT CHANGE UP (ref 3.5–5.3)
PROT SERPL-MCNC: 8.1 G/DL — SIGNIFICANT CHANGE UP (ref 6–8.3)
RBC # BLD: 3.33 M/UL — LOW (ref 4.2–5.8)
RBC # FLD: 15.4 % — HIGH (ref 10.3–14.5)
SODIUM SERPL-SCNC: 137 MMOL/L — SIGNIFICANT CHANGE UP (ref 135–145)
WBC # BLD: 5.1 K/UL — SIGNIFICANT CHANGE UP (ref 3.8–10.5)
WBC # FLD AUTO: 5.1 K/UL — SIGNIFICANT CHANGE UP (ref 3.8–10.5)

## 2017-07-01 RX ADMIN — HEPARIN SODIUM 5000 UNIT(S): 5000 INJECTION INTRAVENOUS; SUBCUTANEOUS at 05:14

## 2017-07-01 NOTE — PROGRESS NOTE ADULT - SUBJECTIVE AND OBJECTIVE BOX
pt to be d/c w/ outpt f/u w/ oncology
CHIEF COMPLAINT:Patient is a 77y old  Male who presents with a chief complaint of generalized weakness, subjective fever (23 Jun 2017 14:53)    	  Interval history:      Allergies:  No Known Allergies      PAST MEDICAL & SURGICAL HISTORY:  BPH (benign prostatic hyperplasia)  Stomach cancer  S/P partial gastrectomy  H/O unilateral nephrectomy  H/O prostatectomy      FAMILY HISTORY:  No pertinent family history in first degree relatives      REVIEW OF SYSTEMS:  CONSTITUTIONAL: No fever, weight loss, or fatigue  EYES: No eye pain, visual disturbances, or discharge  NECK: No pain or stiffness  RESPIRATORY: No cough or wheezing, no shortness of breath  CARDIOVASCULAR: No chest pain, palpitations, dizziness, or leg swelling  GASTROINTESTINAL: No abdominal or epigastric pain. No nausea, vomiting or constipation, + diarrhea  GENITOURINARY: No dysuria, urinary frequency or urgency, no hematuria  NEUROLOGICAL: No headaches, memory loss, loss of strength, numbness, or tremors  SKIN: No itching, burning, rashes, or lesions   MUSCULOSKELETAL: No joint pain or swelling; No muscle, back, or extremity pain    Medications:  MEDICATIONS  (STANDING):  heparin  Injectable 5000Unit(s) SubCutaneous every 8 hours  sodium chloride 0.9%. 1000milliLiter(s) IV Continuous <Continuous>  ciprofloxacin     Tablet 250milliGRAM(s) Oral every 24 hours    MEDICATIONS  (PRN):  acetaminophen   Tablet. 650milliGRAM(s) Oral every 6 hours PRN Moderate Pain (4 - 6)  hydrocortisone 0.5% Ointment 1Application(s) Topical two times a day PRN Itching    	    PHYSICAL EXAM:  T(C): 36.3, Max: 37 (06-24 @ 05:11)  HR: 58 (58 - 65)  BP: 101/54 (101/54 - 113/56)  RR: 16 (16 - 16)  SpO2: 100% (97% - 100%)  Wt(kg): --  I&O's Summary      Appearance: Normal	  HEENT:   NCAT, PERRL, EOMI	  Lymphatic: No lymphadenopathy  Cardiovascular: Normal S1 S2, RRR  Respiratory: Lungs clear to auscultation BL  Psychiatry: A & O x 3, Mood & affect appropriate  Gastrointestinal:  Soft, Non-tender, + BS  Skin: No rashes, No ecchymoses, No cyanosis	  Neurologic: Non-focal  Extremities: Normal range of motion, No clubbing, cyanosis or edema    	  LABS:	 	    CARDIAC MARKERS:  CARDIAC MARKERS ( 23 Jun 2017 09:15 )  x     / < 0.06 ng/mL / 43 u/L / 1.73 ng/mL / x                                    9.8    4.77  )-----------( 111      ( 24 Jun 2017 06:44 )             30.8     06-24    142  |  110<H>  |  22  ----------------------------<  81  4.4   |  17<L>  |  1.03    Ca    8.8      24 Jun 2017 06:44  Phos  3.2     06-24  Mg     1.3     06-24    TPro  7.8  /  Alb  3.1<L>  /  TBili  0.4  /  DBili  x   /  AST  18  /  ALT  15  /  AlkPhos  136<H>  06-24
CHIEF COMPLAINT:Patient is a 77y old  Male who presents with a chief complaint of generalized weakness, subjective fever (23 Jun 2017 14:53)    	  Interval history: diarrhea improving      Allergies:  No Known Allergies      PAST MEDICAL & SURGICAL HISTORY:  BPH (benign prostatic hyperplasia)  Stomach cancer  S/P partial gastrectomy  H/O unilateral nephrectomy  H/O prostatectomy      FAMILY HISTORY:  No pertinent family history in first degree relatives      REVIEW OF SYSTEMS:  CONSTITUTIONAL: No fever, weight loss, or fatigue  EYES: No eye pain, visual disturbances, or discharge  NECK: No pain or stiffness  RESPIRATORY: No cough or wheezing, no shortness of breath  CARDIOVASCULAR: No chest pain, palpitations, dizziness, or leg swelling  GASTROINTESTINAL: No abdominal or epigastric pain. No nausea, vomiting or constipation, + diarrhea  GENITOURINARY: No dysuria, urinary frequency or urgency, no hematuria  NEUROLOGICAL: No headaches, memory loss, loss of strength, numbness, or tremors  SKIN: No itching, burning, rashes, or lesions   MUSCULOSKELETAL: No joint pain or swelling; No muscle, back, or extremity pain      Medications:  MEDICATIONS  (STANDING):  heparin  Injectable 5000Unit(s) SubCutaneous every 8 hours  sodium chloride 0.9%. 1000milliLiter(s) IV Continuous <Continuous>  ciprofloxacin     Tablet 250milliGRAM(s) Oral every 24 hours    MEDICATIONS  (PRN):  acetaminophen   Tablet. 650milliGRAM(s) Oral every 6 hours PRN Moderate Pain (4 - 6)  hydrocortisone 0.5% Ointment 1Application(s) Topical two times a day PRN Itching    	    PHYSICAL EXAM:  T(C): 36.9, Max: 36.9 (06-25 @ 13:52)  HR: 68 (59 - 69)  BP: 97/50 (97/50 - 133/55)  RR: 17 (16 - 17)  SpO2: 99% (98% - 99%)  Wt(kg): --  I&O's Summary      Appearance: Normal	  HEENT:   NCAT, PERRL, EOMI	  Lymphatic: No lymphadenopathy  Cardiovascular: Normal S1 S2, RRR  Respiratory: Lungs clear to auscultation BL  Psychiatry: A & O x 3, Mood & affect appropriate  Gastrointestinal:  Soft, Non-tender, + BS  Skin: No rashes, No ecchymoses, No cyanosis	  Neurologic: Non-focal  Extremities: Normal range of motion, No clubbing, cyanosis or edema    	  LABS:	 	                          9.9    4.56  )-----------( 112      ( 25 Jun 2017 03:45 )             30.5     06-25    138  |  106  |  22  ----------------------------<  76  4.2   |  18<L>  |  1.07    Ca    8.3<L>      25 Jun 2017 03:45  Phos  3.2     06-24  Mg     1.3     06-24    TPro  7.5  /  Alb  3.0<L>  /  TBili  0.3  /  DBili  x   /  AST  25  /  ALT  18  /  AlkPhos  133<H>  06-25
CHIEF COMPLAINT:Patient is a 77y old  Male who presents with a chief complaint of generalized weakness, subjective fever (23 Jun 2017 14:53)    	  pt without new complaints       PAST MEDICAL & SURGICAL HISTORY:  BPH (benign prostatic hyperplasia)  Stomach cancer  S/P partial gastrectomy  H/O unilateral nephrectomy  H/O prostatectomy          REVIEW OF SYSTEMS:  CONSTITUTIONAL: No fever, weight loss, or fatigue  EYES: No eye pain, visual disturbances, or discharge  NECK: No pain or stiffness  RESPIRATORY: No cough, wheezing, chills or hemoptysis; No Shortness of Breath  CARDIOVASCULAR: No chest pain, palpitations, passing out, dizziness, or leg swelling  GASTROINTESTINAL: No abdominal or epigastric pain. No nausea, vomiting, or hematemesis; No diarrhea or constipation. No melena or hematochezia.  GENITOURINARY: No dysuria, frequency, hematuria, or incontinence  NEUROLOGICAL: No headaches, memory loss, loss of strength, numbness, or tremors  SKIN: No itching, burning, rashes, or lesions   LYMPH Nodes: No enlarged glands  ENDOCRINE: No heat or cold intolerance; No hair loss  MUSCULOSKELETAL: No joint pain or swelling; No muscle, back, or extremity pain    Medications:  MEDICATIONS  (STANDING):  heparin  Injectable 5000 Unit(s) SubCutaneous every 8 hours  sodium chloride 0.9%. 1000 milliLiter(s) (75 mL/Hr) IV Continuous <Continuous>  ciprofloxacin     Tablet 250 milliGRAM(s) Oral every 24 hours    MEDICATIONS  (PRN):  acetaminophen   Tablet. 650 milliGRAM(s) Oral every 6 hours PRN Moderate Pain (4 - 6)  hydrocortisone 0.5% Ointment 1 Application(s) Topical two times a day PRN Itching    	    PHYSICAL EXAM:  T(C): 36.9 (06-27-17 @ 05:18), Max: 36.9 (06-27-17 @ 05:18)  HR: 79 (06-27-17 @ 05:18) (59 - 79)  BP: 102/50 (06-27-17 @ 05:18) (102/50 - 108/56)  RR: 18 (06-27-17 @ 05:18) (16 - 18)  SpO2: 97% (06-27-17 @ 05:18) (97% - 100%)  Wt(kg): --  I&O's Summary      Appearance: Normal	  HEENT:   Normal oral mucosa, PERRL, EOMI	  Lymphatic: No lymphadenopathy  Cardiovascular: Normal S1 S2, No JVD, No murmurs, No edema  Respiratory: Lungs clear to auscultation	  Psychiatry: A & O x 3, Mood & affect appropriate  Gastrointestinal:  Soft, Non-tender, + BS	  Skin: No rashes, No ecchymoses, No cyanosis	  Neurologic: Non-focal  Extremities: Normal range of motion, No clubbing, cyanosis or edema  Vascular: Peripheral pulses palpable 2+ bilaterally    TELEMETRY: 	    ECG:  	  RADIOLOGY:  OTHER: 	  	  LABS:	 	    CARDIAC MARKERS:                                9.7    4.77  )-----------( 107      ( 27 Jun 2017 06:53 )             30.1     06-27    138  |  107  |  27<H>  ----------------------------<  79  4.5   |  19<L>  |  1.06    Ca    9.2      27 Jun 2017 06:53  Phos  2.9     06-27  Mg     1.6     06-27    TPro  7.3  /  Alb  2.9<L>  /  TBili  0.4  /  DBili  x   /  AST  31  /  ALT  29  /  AlkPhos  132<H>  06-27    proBNP:   Lipid Profile:   HgA1c:   TSH:
CHIEF COMPLAINT:Patient is a 77y old  Male who presents with a chief complaint of generalized weakness, subjective fever (23 Jun 2017 14:53)    	  pt without new complaints       PAST MEDICAL & SURGICAL HISTORY:  BPH (benign prostatic hyperplasia)  Stomach cancer  S/P partial gastrectomy  H/O unilateral nephrectomy  H/O prostatectomy          REVIEW OF SYSTEMS:  CONSTITUTIONAL: No fever, weight loss, or fatigue  EYES: No eye pain, visual disturbances, or discharge  NECK: No pain or stiffness  RESPIRATORY: No cough, wheezing, chills or hemoptysis; No Shortness of Breath  CARDIOVASCULAR: No chest pain, palpitations, passing out, dizziness, or leg swelling  GASTROINTESTINAL: No abdominal or epigastric pain. No nausea, vomiting, or hematemesis; No diarrhea or constipation. No melena or hematochezia.  GENITOURINARY: No dysuria, frequency, hematuria, or incontinence  NEUROLOGICAL: No headaches, memory loss, loss of strength, numbness, or tremors  SKIN: No itching, burning, rashes, or lesions   LYMPH Nodes: No enlarged glands  ENDOCRINE: No heat or cold intolerance; No hair loss  MUSCULOSKELETAL: No joint pain or swelling; No muscle, back, or extremity pain    Medications:  MEDICATIONS  (STANDING):  heparin  Injectable 5000Unit(s) SubCutaneous every 8 hours  sodium chloride 0.9%. 1000milliLiter(s) IV Continuous <Continuous>  ciprofloxacin     Tablet 250milliGRAM(s) Oral every 24 hours    MEDICATIONS  (PRN):  acetaminophen   Tablet. 650milliGRAM(s) Oral every 6 hours PRN Moderate Pain (4 - 6)  hydrocortisone 0.5% Ointment 1Application(s) Topical two times a day PRN Itching    	    PHYSICAL EXAM:  T(C): 36.8, Max: 36.9 (06-25 @ 13:52)  HR: 61 (61 - 68)  BP: 118/51 (97/50 - 118/51)  RR: 18 (16 - 18)  SpO2: 99% (99% - 99%)  Wt(kg): --  I&O's Summary      Appearance: Normal	  HEENT:   Normal oral mucosa, PERRL, EOMI	  Lymphatic: No lymphadenopathy  Cardiovascular: Normal S1 S2, No JVD, No murmurs, No edema  Respiratory: Lungs clear to auscultation	  Psychiatry: A & O x 3, Mood & affect appropriate  Gastrointestinal:  Soft, Non-tender, + BS	  Skin: No rashes, No ecchymoses, No cyanosis	  Neurologic: Non-focal  Extremities: Normal range of motion, No clubbing, cyanosis or edema  Vascular: Peripheral pulses palpable 2+ bilaterally    TELEMETRY: 	    ECG:  	  RADIOLOGY:  OTHER: 	  	  LABS:	 	    CARDIAC MARKERS:                                9.9    4.91  )-----------( 109      ( 26 Jun 2017 06:00 )             30.5     06-26    140  |  107  |  25<H>  ----------------------------<  83  4.3   |  20<L>  |  1.12    Ca    8.9      26 Jun 2017 06:00    TPro  7.5  /  Alb  3.0<L>  /  TBili  0.4  /  DBili  x   /  AST  37  /  ALT  32  /  AlkPhos  147<H>  06-26    proBNP:   Lipid Profile:   HgA1c:   TSH:
CHIEF COMPLAINT:Patient is a 77y old  Male who presents with a chief complaint of generalized weakness, subjective fever (23 Jun 2017 14:53)    	pt without new complaints        PAST MEDICAL & SURGICAL HISTORY:  BPH (benign prostatic hyperplasia)  Stomach cancer  S/P partial gastrectomy  H/O unilateral nephrectomy  H/O prostatectomy          REVIEW OF SYSTEMS:  CONSTITUTIONAL: No fever, weight loss, or fatigue  EYES: No eye pain, visual disturbances, or discharge  NECK: No pain or stiffness  RESPIRATORY: No cough, wheezing, chills or hemoptysis; No Shortness of Breath  CARDIOVASCULAR: No chest pain, palpitations, passing out, dizziness, or leg swelling  GASTROINTESTINAL: No abdominal or epigastric pain. No nausea, vomiting, or hematemesis; No diarrhea or constipation. No melena or hematochezia.  GENITOURINARY: No dysuria, frequency, hematuria, or incontinence  NEUROLOGICAL: No headaches, memory loss, loss of strength, numbness, or tremors  SKIN: No itching, burning, rashes, or lesions   LYMPH Nodes: No enlarged glands  ENDOCRINE: No heat or cold intolerance; No hair loss  MUSCULOSKELETAL: No joint pain or swelling; No muscle, back, or extremity pain    Medications:  MEDICATIONS  (STANDING):  heparin  Injectable 5000 Unit(s) SubCutaneous every 8 hours  sodium chloride 0.9%. 1000 milliLiter(s) (75 mL/Hr) IV Continuous <Continuous>  ciprofloxacin     Tablet 250 milliGRAM(s) Oral every 24 hours  magnesium sulfate  IVPB 1 Gram(s) IV Intermittent once  magnesium oxide 400 milliGRAM(s) Oral three times a day with meals    MEDICATIONS  (PRN):  acetaminophen   Tablet. 650 milliGRAM(s) Oral every 6 hours PRN Moderate Pain (4 - 6)  hydrocortisone 0.5% Ointment 1 Application(s) Topical two times a day PRN Itching    	    PHYSICAL EXAM:  T(C): 36.6 (06-29-17 @ 06:13), Max: 36.6 (06-28-17 @ 22:10)  HR: 60 (06-29-17 @ 06:13) (59 - 61)  BP: 106/57 (06-29-17 @ 06:13) (105/57 - 109/56)  RR: 18 (06-29-17 @ 06:13) (18 - 18)  SpO2: 99% (06-29-17 @ 06:13) (99% - 99%)  Wt(kg): --  I&O's Summary      Appearance: Normal	  HEENT:   Normal oral mucosa, PERRL, EOMI	  Lymphatic: No lymphadenopathy  Cardiovascular: Normal S1 S2, No JVD, No murmurs, No edema  Respiratory: Lungs clear to auscultation	  Psychiatry: A & O x 3, Mood & affect appropriate  Gastrointestinal:  Soft, Non-tender, + BS	  Skin: No rashes, No ecchymoses, No cyanosis	  Neurologic: Non-focal  Extremities: Normal range of motion, No clubbing, cyanosis or edema  Vascular: Peripheral pulses palpable 2+ bilaterally    TELEMETRY: 	    ECG:  	  RADIOLOGY:  OTHER: 	  	  LABS:	 	    CARDIAC MARKERS:                                9.6    5.24  )-----------( 109      ( 29 Jun 2017 07:00 )             29.0     06-29    137  |  104  |  29<H>  ----------------------------<  77  4.6   |  20<L>  |  1.08    Ca    9.1      29 Jun 2017 07:00  Phos  3.7     06-29  Mg     1.3     06-29    TPro  7.6  /  Alb  3.1<L>  /  TBili  0.4  /  DBili  x   /  AST  31  /  ALT  32  /  AlkPhos  154<H>  06-29    proBNP:   Lipid Profile:   HgA1c:   TSH:
CHIEF COMPLAINT:Patient is a 77y old  Male who presents with a chief complaint of generalized weakness, subjective fever (30 Jun 2017 01:28)    	  Interval history: pt feeling well, no complaints      Allergies:  No Known Allergies      PAST MEDICAL & SURGICAL HISTORY:  BPH (benign prostatic hyperplasia)  Stomach cancer  S/P partial gastrectomy  H/O unilateral nephrectomy  H/O prostatectomy      FAMILY HISTORY:  No pertinent family history in first degree relatives      REVIEW OF SYSTEMS:  CONSTITUTIONAL: No fever, weight loss, or fatigue  EYES: No eye pain, visual disturbances, or discharge  NECK: No pain or stiffness  RESPIRATORY: No cough or wheezing, no shortness of breath  CARDIOVASCULAR: No chest pain, palpitations, dizziness, or leg swelling  GASTROINTESTINAL: No abdominal or epigastric pain. No nausea, vomiting, diarrhea or constipation  GENITOURINARY: No dysuria, urinary frequency or urgency, no hematuria  NEUROLOGICAL: No headaches, memory loss, loss of strength, numbness, or tremors  SKIN: No itching, burning, rashes, or lesions   MUSCULOSKELETAL: No joint pain or swelling; No muscle, back, or extremity pain    Medications:  MEDICATIONS  (STANDING):  heparin  Injectable 5000 Unit(s) SubCutaneous every 8 hours  sodium chloride 0.9%. 1000 milliLiter(s) (75 mL/Hr) IV Continuous <Continuous>  ciprofloxacin     Tablet 250 milliGRAM(s) Oral every 24 hours  magnesium oxide 400 milliGRAM(s) Oral three times a day with meals    MEDICATIONS  (PRN):  acetaminophen   Tablet. 650 milliGRAM(s) Oral every 6 hours PRN Moderate Pain (4 - 6)  hydrocortisone 0.5% Ointment 1 Application(s) Topical two times a day PRN Itching    	    PHYSICAL EXAM:  T(C): 37 (06-30-17 @ 05:23), Max: 37.2 (06-29-17 @ 21:53)  HR: 65 (06-30-17 @ 05:23) (64 - 65)  BP: 102/56 (06-30-17 @ 05:23) (101/52 - 102/56)  RR: 18 (06-30-17 @ 05:23) (18 - 18)  SpO2: 97% (06-30-17 @ 05:23) (97% - 98%)  Wt(kg): --  I&O's Summary      Appearance: Normal	  HEENT:   NCAT, PERRL, EOMI	  Lymphatic: No lymphadenopathy  Cardiovascular: Normal S1 S2, RRR  Respiratory: Lungs clear to auscultation BL  Psychiatry: A & O x 3, Mood & affect appropriate  Gastrointestinal:  Soft, Non-tender, + BS  Skin: No rashes, No ecchymoses, No cyanosis	  Neurologic: Non-focal  Extremities: Normal range of motion, No clubbing, cyanosis or edema    	  LABS:	 	    CARDIAC MARKERS:                                9.6    5.24  )-----------( 109      ( 29 Jun 2017 07:00 )             29.0     06-29    137  |  104  |  29<H>  ----------------------------<  77  4.6   |  20<L>  |  1.08    Ca    9.1      29 Jun 2017 07:00  Phos  3.7     06-29  Mg     1.3     06-29    TPro  7.6  /  Alb  3.1<L>  /  TBili  0.4  /  DBili  x   /  AST  31  /  ALT  32  /  AlkPhos  154<H>  06-29    proBNP:   Lipid Profile:   HgA1c:   TSH:
Hematology Follow-up    INTERVAL HPI/OVERNIGHT EVENTS:  Patient S&E at bedside.     VITAL SIGNS:  T(F): 98.6 (06-30-17 @ 05:23)  HR: 65 (06-30-17 @ 05:23)  BP: 102/56 (06-30-17 @ 05:23)  RR: 18 (06-30-17 @ 05:23)  SpO2: 97% (06-30-17 @ 05:23)  Wt(kg): --    PHYSICAL EXAM:    Constitutional: AAOx3, NAD,   Eyes: PERRL, EOMI, sclera non-icteric  Neck: supple, no masses, no JVD  Respiratory: CTA b/l, good air entry b/l, no wheezing, rhonchi, rales, with normal respiratory effort and no intercostal retractions  Cardiovascular: RRR, normal S1S2, no M/R/G  Gastrointestinal: soft, NTND, no masses palpable, BS normal in all four quadrants, no HSM  Extremities:  no c/c/e  Neurological: Grossly intact  Skin: Normal temperature    MEDICATIONS  (STANDING):  heparin  Injectable 5000 Unit(s) SubCutaneous every 8 hours  sodium chloride 0.9%. 1000 milliLiter(s) (75 mL/Hr) IV Continuous <Continuous>  ciprofloxacin     Tablet 250 milliGRAM(s) Oral every 24 hours  magnesium oxide 400 milliGRAM(s) Oral three times a day with meals    MEDICATIONS  (PRN):  acetaminophen   Tablet. 650 milliGRAM(s) Oral every 6 hours PRN Moderate Pain (4 - 6)  hydrocortisone 0.5% Ointment 1 Application(s) Topical two times a day PRN Itching      No Known Allergies      LABS:                        9.6    5.24  )-----------( 109      ( 29 Jun 2017 07:00 )             29.0     06-29    137  |  104  |  29<H>  ----------------------------<  77  4.6   |  20<L>  |  1.08    Ca    9.1      29 Jun 2017 07:00  Phos  3.7     06-29  Mg     1.3     06-29    TPro  7.6  /  Alb  3.1<L>  /  TBili  0.4  /  DBili  x   /  AST  31  /  ALT  32  /  AlkPhos  154<H>  06-29           RADIOLOGY & ADDITIONAL TESTS:  Studies reviewed.      < from: CT Chest w/ IV Cont (06.24.17 @ 16:40) >  IMPRESSION:    Multiple bilateral pulmonary nodules and masses consistent with   metastatic disease.    Abnormal lymph nodes and peritoneal nodules, as above.    Suspicious lesion and L2 vertebral body.    < end of copied text >

## 2017-07-01 NOTE — PROGRESS NOTE ADULT - PROVIDER SPECIALTY LIST ADULT
[facility]eme/Onc
Internal Medicine

## 2017-07-05 PROBLEM — C16.9 MALIGNANT NEOPLASM OF STOMACH, UNSPECIFIED: Chronic | Status: ACTIVE | Noted: 2017-06-23

## 2017-07-05 PROBLEM — N40.0 BENIGN PROSTATIC HYPERPLASIA WITHOUT LOWER URINARY TRACT SYMPTOMS: Chronic | Status: ACTIVE | Noted: 2017-06-23

## 2017-07-05 PROBLEM — Z00.00 ENCOUNTER FOR PREVENTIVE HEALTH EXAMINATION: Status: ACTIVE | Noted: 2017-07-05

## 2017-07-05 LAB — NON-GYNECOLOGICAL CYTOLOGY STUDY: SIGNIFICANT CHANGE UP

## 2017-07-10 LAB — NON-GYNECOLOGICAL CYTOLOGY STUDY: SIGNIFICANT CHANGE UP

## 2017-07-25 ENCOUNTER — OUTPATIENT (OUTPATIENT)
Dept: OUTPATIENT SERVICES | Facility: HOSPITAL | Age: 77
LOS: 1 days | Discharge: HOME | End: 2017-07-25

## 2017-07-25 DIAGNOSIS — Z90.3 ACQUIRED ABSENCE OF STOMACH [PART OF]: Chronic | ICD-10-CM

## 2017-07-25 DIAGNOSIS — Z90.5 ACQUIRED ABSENCE OF KIDNEY: Chronic | ICD-10-CM

## 2017-07-25 DIAGNOSIS — Z90.79 ACQUIRED ABSENCE OF OTHER GENITAL ORGAN(S): Chronic | ICD-10-CM

## 2017-07-25 DIAGNOSIS — C64.9 MALIGNANT NEOPLASM OF UNSPECIFIED KIDNEY, EXCEPT RENAL PELVIS: ICD-10-CM

## 2017-07-27 ENCOUNTER — APPOINTMENT (OUTPATIENT)
Dept: HEMATOLOGY ONCOLOGY | Facility: CLINIC | Age: 77
End: 2017-07-27

## 2017-08-01 ENCOUNTER — APPOINTMENT (OUTPATIENT)
Dept: INTERNAL MEDICINE | Facility: CLINIC | Age: 77
End: 2017-08-01

## 2019-05-02 NOTE — ED ADULT NURSE NOTE - NS ED NOTE ABUSE SUSPICION NEGLECT YN
----- Message from Maribeth Jones MD sent at 5/2/2019  8:15 AM CDT -----  Normal PTH and thyroid antibodies   Vit D is very low, start vit D 1000 u daily   No

## 2020-07-14 NOTE — H&P ADULT - NEUROLOGICAL SYMPTOMS
No complaints No complaints difficulty walking/difficulty walking due to pain in feet and knees/headache No complaints

## 2020-09-04 NOTE — PATIENT PROFILE ADULT. - NS TRANSFER DISPOSITION PATIENT BELONGINGS
----- Message from Enid Lundberg sent at 9/4/2020 11:12 AM CDT -----  Contact: Linda Stover [phone]  Mom needs call back. It's concerning appt scheduled on 9/10     with patient

## 2025-01-15 NOTE — DIETITIAN INITIAL EVALUATION ADULT. - ETIOLOGY
Requested Prescriptions     Pending Prescriptions Disp Refills    rivaroxaban (XARELTO) 2.5 MG TABS tablet 180 tablet 2     Sig: Take 1 tablet by mouth 2 times daily           Verified rx. Last OV 10/2/24. Erx to pharm on file.    
related to pt meets criteria for severe malnutrition in the context of acute illness